# Patient Record
Sex: MALE | URBAN - METROPOLITAN AREA
[De-identification: names, ages, dates, MRNs, and addresses within clinical notes are randomized per-mention and may not be internally consistent; named-entity substitution may affect disease eponyms.]

---

## 2019-07-05 ENCOUNTER — TRANSFERRED RECORDS (OUTPATIENT)
Dept: HEALTH INFORMATION MANAGEMENT | Facility: CLINIC | Age: 59
End: 2019-07-05

## 2019-07-18 ENCOUNTER — TRANSFERRED RECORDS (OUTPATIENT)
Dept: HEALTH INFORMATION MANAGEMENT | Facility: CLINIC | Age: 59
End: 2019-07-18

## 2020-01-28 ENCOUNTER — TRANSFERRED RECORDS (OUTPATIENT)
Dept: HEALTH INFORMATION MANAGEMENT | Facility: CLINIC | Age: 60
End: 2020-01-28

## 2020-07-21 ENCOUNTER — TRANSFERRED RECORDS (OUTPATIENT)
Dept: HEALTH INFORMATION MANAGEMENT | Facility: CLINIC | Age: 60
End: 2020-07-21

## 2020-07-22 ENCOUNTER — TRANSCRIBE ORDERS (OUTPATIENT)
Dept: OTHER | Age: 60
End: 2020-07-22

## 2020-07-22 DIAGNOSIS — G25.9 MOVEMENT DISORDER: Primary | ICD-10-CM

## 2020-07-22 NOTE — TELEPHONE ENCOUNTER
"  RECORDS RECEIVED FROM: External   Date of Appt: 7/24/20   NOTES (FOR ALL VISITS) STATUS DETAILS   OFFICE NOTE from referring provider Received  Dr. Anjana Palma at [facility] (PCP):  7/21/20   OFFICE NOTE from other specialist Received Dr Yenifer Lu @ [facility] (PCP):  7/18/19 6/20/19   DISCHARGE SUMMARY from hospital N/A    DISCHARGE REPORT from the ER N/A    OPERATIVE REPORT N/A    MEDICATION LIST Received    IMAGING  (FOR ALL VISITS)     EMG N/A    EEG N/A    MRI (HEAD, NECK, SPINE) Received [facility]:  MRI Lumbar Spine 7/5/19   LUMBAR PUNCTURE N/A    DANIE Scan N/A    CT (HEAD, NECK, SPINE) N/A       Action 7/22/20 MV 2.41pm   Action Taken Records and imaging request faxed to [facility]    Records and imaging request faxed to [facility]     Action 7/23/20 6.49am   Action Taken Records received from [facility] via fax. Sent to urgent scanning     Phone Call:  7/23/20 MV 2.37pm   Contact Name [facility]   Outcome Called to check on status of request. Per HIM, the request was received and records will be sent over shortly.     Action 7/23/20 MV 2.56pm   Action Taken Records received from [facility] via fax. Sent to scanning     Phone Call:  7/24/20 MV 8.10am   Contact Name [facility]   Outcome Spoke with HIM, patient did not see neurology at [facility]. The only neurology-related note they have is a \"no show\" in 2019.                 "

## 2020-07-23 PROBLEM — M54.9 CHRONIC BACK PAIN: Status: ACTIVE | Noted: 2020-07-23

## 2020-07-23 PROBLEM — D69.6 THROMBOCYTOPENIA (H): Status: ACTIVE | Noted: 2020-07-23

## 2020-07-23 PROBLEM — G89.29 CHRONIC BACK PAIN: Status: ACTIVE | Noted: 2020-07-23

## 2020-07-23 PROBLEM — R25.9 ABNORMAL INVOLUNTARY MOVEMENT: Status: ACTIVE | Noted: 2020-07-23

## 2020-07-23 PROBLEM — F41.9 ANXIETY: Status: ACTIVE | Noted: 2020-07-23

## 2020-07-23 PROBLEM — I10 HTN (HYPERTENSION): Status: ACTIVE | Noted: 2020-07-23

## 2020-07-23 PROBLEM — S06.9XAA TBI (TRAUMATIC BRAIN INJURY) (H): Status: ACTIVE | Noted: 2020-07-23

## 2020-07-23 PROBLEM — E78.9 LIPID DISORDER: Status: ACTIVE | Noted: 2020-07-23

## 2020-07-23 PROBLEM — F32.A DEPRESSION: Status: ACTIVE | Noted: 2020-07-23

## 2020-07-23 PROBLEM — F89 DEVELOPMENTAL DISORDER: Status: ACTIVE | Noted: 2020-07-23

## 2020-07-23 PROBLEM — F17.200 TOBACCO DEPENDENCE: Status: ACTIVE | Noted: 2020-07-23

## 2020-07-23 RX ORDER — SERTRALINE HYDROCHLORIDE 100 MG/1
100 TABLET, FILM COATED ORAL DAILY
COMMUNITY
End: 2020-07-24

## 2020-07-23 RX ORDER — ATORVASTATIN CALCIUM 20 MG/1
20 TABLET, FILM COATED ORAL DAILY
COMMUNITY
End: 2020-07-24

## 2020-07-23 RX ORDER — CHLORTHALIDONE 25 MG/1
25 TABLET ORAL DAILY
COMMUNITY
End: 2020-07-24

## 2020-07-23 NOTE — PROGRESS NOTES
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 intermittent headache, dizziness, balance deficit, visual overstimulation, sleep impairment, fatigue, probable vision changes, diplopia, words jumping on the page, tinnitus, phonosensitivity, and mood changes.   2. Longstanding neurodevelopmental cognitive impairments, likely exacerbated by anxiety and depression per neuropsychological report.     PLAN:   1. Mild Traumatic brain injury. The pathophysiology and prognosis of the traumatic brain injury were explained to patient. The dangers of recurrent brain injury and alcohol use were also explained. Problems with mental and physical exertion were explained in great detail. Informational handouts provided. Had a rell conversation with the patient, and advised that given the period of time out from his injury date, it can be difficult to manage chronic and persisting symptoms. Discussed how compensatory strategies and energy management techniques may help to promote his safety, and improve quality of life measures.     2. Headache: Patient is encouraged to monitor frequency and intensity of headaches is mainly off her in follow-up about triggers. He prefers not to take medication for pain management.    3. Vision: Referral to developmental optometry.    4. Dizziness: Referral to audiology clinic for full vestibular evaluation.    5. Sleep/fatigue: Monitor, disputed this will improve as physical pain is reduced and energy returns to normal.    6. Cognitive symptoms: Referral to speech therapy for basic compensatory strategies and energy management techniques. Patient and his sister informed that this will only be a short course to provide him tools to improve his day-to-day functioning.    7. Housing/safety/any resources: Referral to TBI clinical .    8. Return to clinic follow-up in one to 2 months.    Sunni Mortensen PA-C, 6/28/2018 1:31 PM     Dictation Disclaimer: Notes are completed with voice-recognition dictation  software. Errors are generally corrected in real time. Please contact me via Epic staff message if you note any errors requiring clarification.       Electronically signed by Sunni Mortensen PA-C at 06/28/2018 2:20 PM CDT

## 2020-07-24 ENCOUNTER — PRE VISIT (OUTPATIENT)
Dept: NEUROLOGY | Facility: CLINIC | Age: 60
End: 2020-07-24

## 2020-07-24 ENCOUNTER — VIRTUAL VISIT (OUTPATIENT)
Dept: NEUROLOGY | Facility: CLINIC | Age: 60
End: 2020-07-24
Payer: COMMERCIAL

## 2020-07-24 DIAGNOSIS — F89 DEVELOPMENTAL DISORDER: ICD-10-CM

## 2020-07-24 DIAGNOSIS — F17.200 TOBACCO DEPENDENCE: ICD-10-CM

## 2020-07-24 DIAGNOSIS — F41.9 ANXIETY: ICD-10-CM

## 2020-07-24 DIAGNOSIS — E78.9 LIPID DISORDER: ICD-10-CM

## 2020-07-24 DIAGNOSIS — I35.9 AORTIC VALVE CALCIFICATION: ICD-10-CM

## 2020-07-24 DIAGNOSIS — H54.7 VISION PROBLEMS: Primary | ICD-10-CM

## 2020-07-24 DIAGNOSIS — D69.6 THROMBOCYTOPENIA (H): ICD-10-CM

## 2020-07-24 DIAGNOSIS — R25.9 ABNORMAL INVOLUNTARY MOVEMENT: ICD-10-CM

## 2020-07-24 PROBLEM — F32.1 CURRENT MODERATE EPISODE OF MAJOR DEPRESSIVE DISORDER WITHOUT PRIOR EPISODE (H): Status: ACTIVE | Noted: 2020-07-21

## 2020-07-24 PROBLEM — J44.9 COPD (CHRONIC OBSTRUCTIVE PULMONARY DISEASE) (H): Status: ACTIVE | Noted: 2020-07-24

## 2020-07-24 PROBLEM — F02.80 MAJOR NEUROCOGNITIVE DISORDER DUE TO TRAUMATIC BRAIN INJURY (H): Status: ACTIVE | Noted: 2019-08-13

## 2020-07-24 PROBLEM — G25.9 MOVEMENT DISORDER: Status: ACTIVE | Noted: 2020-07-21

## 2020-07-24 PROBLEM — Z87.828 HISTORY OF TRAUMATIC HEAD INJURY: Status: ACTIVE | Noted: 2020-07-21

## 2020-07-24 PROBLEM — E78.00 PURE HYPERCHOLESTEROLEMIA: Status: ACTIVE | Noted: 2017-09-09

## 2020-07-24 PROBLEM — Z12.11 SCREENING FOR COLON CANCER: Status: ACTIVE | Noted: 2017-09-07

## 2020-07-24 PROBLEM — S06.9XAS MAJOR NEUROCOGNITIVE DISORDER DUE TO TRAUMATIC BRAIN INJURY (H): Status: ACTIVE | Noted: 2019-08-13

## 2020-07-24 PROBLEM — F81.0 SPECIFIC LEARNING DISORDER, WITH IMPAIRMENT IN READING, MODERATE: Status: ACTIVE | Noted: 2019-08-13

## 2020-07-24 RX ORDER — CHLORTHALIDONE 50 MG/1
TABLET ORAL
COMMUNITY
Start: 2020-02-13 | End: 2020-07-24

## 2020-07-24 RX ORDER — SERTRALINE HYDROCHLORIDE 100 MG/1
TABLET, FILM COATED ORAL
COMMUNITY
Start: 2020-07-24 | End: 2020-08-03

## 2020-07-24 RX ORDER — ATORVASTATIN CALCIUM 40 MG/1
TABLET, FILM COATED ORAL
COMMUNITY
Start: 2020-07-24

## 2020-07-24 RX ORDER — PROPRANOLOL HCL 60 MG
1 CAPSULE, EXTENDED RELEASE 24HR ORAL
COMMUNITY
Start: 2020-04-29 | End: 2020-07-24

## 2020-07-24 RX ORDER — AMLODIPINE BESYLATE 5 MG/1
5 TABLET ORAL DAILY
COMMUNITY
Start: 2020-07-24

## 2020-07-24 RX ORDER — CHLORTHALIDONE 25 MG/1
TABLET ORAL
COMMUNITY
Start: 2020-07-24 | End: 2020-07-24

## 2020-07-24 RX ORDER — CITALOPRAM HYDROBROMIDE 10 MG/1
10 TABLET ORAL DAILY
COMMUNITY
Start: 2020-07-24

## 2020-07-24 RX ORDER — PROPRANOLOL HCL 60 MG
CAPSULE, EXTENDED RELEASE 24HR ORAL
COMMUNITY
Start: 2020-07-24

## 2020-07-24 RX ORDER — SERTRALINE HYDROCHLORIDE 25 MG/1
25 TABLET, FILM COATED ORAL
COMMUNITY
Start: 2020-07-21 | End: 2020-07-24

## 2020-07-24 RX ORDER — CITALOPRAM HYDROBROMIDE 10 MG/1
10 TABLET ORAL
COMMUNITY
Start: 2020-08-11 | End: 2020-07-24

## 2020-07-24 RX ORDER — AMLODIPINE BESYLATE 5 MG/1
TABLET ORAL
COMMUNITY
Start: 2020-07-24 | End: 2020-07-24

## 2020-07-24 RX ORDER — ATORVASTATIN CALCIUM 40 MG/1
0.5 TABLET, FILM COATED ORAL
COMMUNITY
Start: 2020-02-13 | End: 2020-07-24

## 2020-07-24 RX ORDER — CHLORTHALIDONE 50 MG/1
TABLET ORAL
COMMUNITY
Start: 2020-07-24 | End: 2020-07-24

## 2020-07-24 RX ORDER — AMLODIPINE BESYLATE 5 MG/1
5 TABLET ORAL
COMMUNITY
Start: 2020-07-21 | End: 2020-07-24

## 2020-07-24 RX ORDER — SERTRALINE HYDROCHLORIDE 25 MG/1
TABLET, FILM COATED ORAL
COMMUNITY
Start: 2020-07-24 | End: 2020-08-03

## 2020-07-24 RX ORDER — SERTRALINE HYDROCHLORIDE 100 MG/1
1 TABLET, FILM COATED ORAL
COMMUNITY
Start: 2019-06-20 | End: 2020-07-24

## 2020-07-24 NOTE — PATIENT INSTRUCTIONS
Assessment:  Developmental disorder - finished 9th grade and had problems with school   Hypertension 140/85 yesterday - pulse was 66 on one antihypertensive medication and second is  Being added.   TBI - had brain surgery  Chronic back pain  Mood disorder - ongoing problems - suppose to transition from sertraline to citalopram  Smoker/severe copd  Visual problems  Has some hearing problems.   Constipation but is not taking anything for this.   Nocturia with normal PSA  Mild anemia and thrombocytopenia    Consented to stephanie access to [facility]    He is left handed man with history of involuntary movement    He has had involuntary tongue protrusion for 4 years. There is no clear aggravating or ameliorative factors. He has involuntary movements of his arms and legs. These appear to be choreiform in nature.      Cognitive decline    Medications            Amlodipine norvasc 5mg Has not yet started       Atorvastatin lipitor 40mg  1/2       Chlorthalidone hygroton 25mg  Not taking       Citalopram celexa 10mg Has not  Yet started       Cyanocobalamin Vitamin B12 1000mcg 1       Propranolol ER Inderal LA 60mg 24 hr capsule 1       Sertraline zoloft 25mg Using for wean       Sertraline zoloft 100mg  Will wean off       Tiotropium spiriva respimat 2.5 mcg/act inhaler 2 puffs once or ?twice daily                                                                                                                                                                 Plan:    Needs an eye appointment - optometry - p [facility] in the [facility] area    Needs followup Chest CT scan to compare with  His 3/2019 study as a screen for lung cancer    Consider cardiac echocardiogram to look at his aortic valves    Genetics visit with Conor Gaspar to discuss [facility] disease testing    Will need a face to face visit with me or/and a fellow to review his examination to help sort out the nature of his movements (functional,  neurological, etc. ) he denies a history of neuroleptic drug exposure.     Head CT scan without contrast to make sure that the subdural has resolved and no other lesions noted. He has movements so will start with the CT scan as it is faster than a brain MRI.     Pharmacy visit    He is being transitioned in his antidepressant medications but has not picked up the 25mg of sertraline and the 10mg of citalopram    He also has not picked up the second blood pressure medication (amlodipine) to take with his beta blocker propranolol     He is continuing on his cholesterol medication atorvastatin/lipitor 1/2 of 40mg    He is not interested in quitting smoking and has had severe emphysema based on his ct scan and uses the inhaler twice a daily - ?should it be once daily    He is continuing on his vitamin B12.

## 2020-07-24 NOTE — LETTER
"[facility]  [facility]  [address] [address] [phone]            2020          Dear [facility] Proxy Patient,    We received a request to activate you as a proxy for another patient of [facility] or Jasbir. In order to do so, we need to activate your [employer] account as well.    Your access code is: [unfilled]       Please access the [employer] website:  -  [place]: [email or web address]  -  [email or web address].    Below the ID and password fields, select the \"Sign Up Now\" as New User.  You will be prompted to enter the access code listed above as well as additional personal information.  Please follow the directions carefully when creating your username and password.    Once your account is activated, you can access the proxy accounts under \"Shared Medical Records\".    If you allow your access code to , or if you have any questions please call [employer] support at [phone] during normal clinic hours.     Sincerely,        [employer] Customer Service    "
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
## 2020-07-28 ENCOUNTER — TELEPHONE (OUTPATIENT)
Dept: NEUROLOGY | Facility: CLINIC | Age: 60
End: 2020-07-28

## 2020-07-28 NOTE — TELEPHONE ENCOUNTER
Left message for patient to schedule genetic consult. Offered appointment times for 7/31    ----- Message from Ck Reddy MD sent at 7/24/2020  3:33 PM CDT -----  Regarding: HD movement consultation  Will need a visit with Conor Gaspar for HD testing and a visit with me or a fellow to do a face to face visit as well as a HEAD CT SCAN.

## 2020-07-31 ENCOUNTER — TELEPHONE (OUTPATIENT)
Dept: NEUROLOGY | Facility: CLINIC | Age: 60
End: 2020-07-31

## 2020-07-31 NOTE — TELEPHONE ENCOUNTER
Health Call Center    Phone Message    May a detailed message be left on voicemail: yes     Reason for Call: Other: Shraddha calling to request office notes from 07/24. Please fax notes to [facility] [phone].    Please call Shraddha back with any questions.       Action Taken: Message routed to:  [facility]  neuro     Travel Screening: Not Applicable

## 2020-08-03 ENCOUNTER — VIRTUAL VISIT (OUTPATIENT)
Dept: PHARMACY | Facility: CLINIC | Age: 60
End: 2020-08-03
Payer: COMMERCIAL

## 2020-08-03 DIAGNOSIS — E78.00 PURE HYPERCHOLESTEROLEMIA: ICD-10-CM

## 2020-08-03 DIAGNOSIS — E53.8 VITAMIN B12 DEFICIENCY (NON ANEMIC): ICD-10-CM

## 2020-08-03 DIAGNOSIS — I10 ESSENTIAL HYPERTENSION: ICD-10-CM

## 2020-08-03 DIAGNOSIS — F33.1 MAJOR DEPRESSIVE DISORDER, RECURRENT EPISODE, MODERATE (H): Primary | ICD-10-CM

## 2020-08-03 PROCEDURE — 99607 MTMS BY PHARM ADDL 15 MIN: CPT | Mod: TEL | Performed by: PHARMACIST

## 2020-08-03 PROCEDURE — 99605 MTMS BY PHARM NP 15 MIN: CPT | Mod: TEL | Performed by: PHARMACIST

## 2020-08-03 NOTE — PROGRESS NOTES
"MTM ENCOUNTER  SUBJECTIVE/OBJECTIVE:                           Cristian Rene is a 59 year old male called for an initial visit. He was referred to me from Dr. Reddy. Patient was accompanied by Nani (sister).     Patient consented to a telehealth visit: yes  Telemedicine Visit Details  Type of service:  Telephone visit  Start Time: 11:18 AM  End Time: 11:28 AM  Originating Location (pt. Location): Home  Distant Location (provider location):  [facility]UROLOGY [facility]IN[facility] MTM  Mode of Communication:  Telephone    Chief Complaint: initial medication review    Allergies/ADRs: Reviewed in EHR  Tobacco:  reports that he has been smoking. He has never used smokeless tobacco.  Alcohol: not currently using  Caffeine: multiple cups/day of coffee and [employer]eps[employer] - unable to quantify but states \"drinking caffeine all day\"  Activity: not discussed  PMH: Reviewed in EHR    Medication Adherence/Access: no issues reported    Depression:  Current medications include: sertraline 25 mg daily and citalopram 10 mg daily. Patient is currently tapering off sertraline and onto citalopram. Last PHQ-9 was 12 on 7/21/20. Plan to change antidepressants due to concern that they are contributing to the patient's choreic movements.     Hypertension: Current medications include amlodipine 5 mg daily and propranolol ER 60 mg daily.  Patient does not self-monitor BP.  Patient reports no current medication side effects. Denies any improvement in tremor since starting propranolol. Last clinic BP was 140/85 on 7/21/20 at primary care.    Hyperlipidemia: Current therapy includes atorvastatin 20 mg once daily.  Pt reports no significant myalgias or other side effects.    Last lipid panel from 7/18/19:      Vitamin B12 deficiency: Taking vitamin B12 1000 mcg daily. Last serum level was 678 from 7/18/19.     Today's Vitals: There were no vitals taken for this visit.  (telemed visit)    ASSESSMENT:                            Medication Adherence: excellent, no " issues identified    Depression:  Needs improvement. Now that patient is on citalopram he should stop taking sertraline to prevent risk of serotonin syndrome.     Hypertension: stable.    Hyperlipidemia: stable.    Vitamin B12 deficiency: stable.    PLAN:                            1. Patient to stop sertraline and continue on citalopram 10 mg daily and follow up with PCP.  2. No other medication changes recommended at this time.     I spent 10 minutes with this patient today. I offer these suggestions for consideration by Dr. Reddy. A copy of the visit note was provided to the patient's referring provider.    Will follow up in 1 year or sooner if needed    The patient declined a summary of these recommendations.     Yris Grady, Pharm.D.  Medication Therapy Management Pharmacist  Phone: [phone]

## 2020-08-04 ENCOUNTER — TELEPHONE (OUTPATIENT)
Dept: OPHTHALMOLOGY | Facility: CLINIC | Age: 60
End: 2020-08-04

## 2020-08-04 NOTE — TELEPHONE ENCOUNTER
[facility] Call Center    Phone Message    May a detailed message be left on voicemail: yes     Reason for Call: Appointment Intake    Referring Provider Name: Ck Reddy  Diagnosis and/or Symptoms: Vision problems    Pts sister called to set up Appt for Pt, but sister was unable to identify what type of vision issues the Pt is having. I tried calling the back line. Pt would like to be seen ASAP. Please advise. Thank you    Action Taken: Message routed to:  [facility] EYE    Travel Screening: Not Applicable

## 2020-08-04 NOTE — TELEPHONE ENCOUNTER
TRIAGE TELEPHONE ENCOUNTER  Date contacted: August 4, 2020 4:03 PM    Type of contact: LVM  Attending provider(s) or other suggested: [place] versus other optometrist willing to see new patient with ongoing tx w/ neurology.     Current eye symptoms/concerns: increased vision blurriness    Suggested treatment plan: optometry eval (as indicated by referal note from Nuerology MD Reddy)     Refills needed: n/a    Next/current appointment date: tbd  Best date/time for rescheduling or with another provider or clinic: tbd    Informed about the eye clinic COVID-19 mask and visitor policy: DALE Lugo COT 4:03 PM August 4, 2020

## 2020-08-05 ENCOUNTER — ANCILLARY PROCEDURE (OUTPATIENT)
Dept: CT IMAGING | Facility: CLINIC | Age: 60
End: 2020-08-05
Attending: PSYCHIATRY & NEUROLOGY
Payer: COMMERCIAL

## 2020-08-05 DIAGNOSIS — R25.9 ABNORMAL INVOLUNTARY MOVEMENT: ICD-10-CM

## 2020-08-07 NOTE — PROGRESS NOTES
GENETIC COUNSELING-Neurology  I attempted to start a telephone visit and the family told me that they need to reschedule until 8/28/2020 at 11:30 AM.    Conor Gaspar MS, [facility] [place]  Certified Genetic Counselor

## 2020-08-28 ENCOUNTER — VIRTUAL VISIT (OUTPATIENT)
Dept: NEUROLOGY | Facility: CLINIC | Age: 60
End: 2020-08-28
Attending: PSYCHIATRY & NEUROLOGY
Payer: COMMERCIAL

## 2020-08-28 DIAGNOSIS — R25.9 ABNORMAL INVOLUNTARY MOVEMENT: Primary | ICD-10-CM

## 2020-08-28 NOTE — PROGRESS NOTES
GENETIC COUNSELING-Neurology  I attempted to reach Cristian twice today for his telephone visit and had no answer. I left a voicemail asking them to call me back.    Conor Gaspar MS, [facility] [place]  Certified Genetic Counselor

## 2020-08-28 NOTE — LETTER
8/7/2020     RE: Cristian Rene  [address]  [place] [address]     Dear Colleague,    Thank you for referring your patient, Cristian Rene, to the [facility] NEUROLOGY at [facility]. Please see a copy of my visit note below.    GENETIC COUNSELING-Neurology  I attempted to start a telephone visit and the family told me that they need to reschedule until 8/28/2020 at 11:30 AM.    Conor Gaspar MS, [facility] [place] [facility]  Certified Genetic Counselor    Again, thank you for allowing me to participate in the care of your patient.      Sincerely,    Alfredo Gaspar GC

## 2020-09-04 ENCOUNTER — VIRTUAL VISIT (OUTPATIENT)
Dept: NEUROLOGY | Facility: CLINIC | Age: 60
End: 2020-09-04
Payer: COMMERCIAL

## 2020-09-04 ENCOUNTER — TELEPHONE (OUTPATIENT)
Dept: NEUROLOGY | Facility: CLINIC | Age: 60
End: 2020-09-04

## 2020-09-04 DIAGNOSIS — R25.9 ABNORMAL INVOLUNTARY MOVEMENT: Primary | ICD-10-CM

## 2020-09-04 DIAGNOSIS — G25.5 CHOREA: Primary | ICD-10-CM
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
## 2020-09-04 NOTE — TELEPHONE ENCOUNTER
GENETIC COUNSELING-NEurology  I connected with Cristian and his sister today and I am waiting for the formal appintment to be created to document my note. I am using this telephone encounter to place the order for HD genetic testing    Conor Gaspar MS, [facility]  C
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
## 2020-10-27 ENCOUNTER — TELEPHONE (OUTPATIENT)
Dept: NEUROLOGY | Facility: CLINIC | Age: 60
End: 2020-10-27

## 2020-10-27 NOTE — TELEPHONE ENCOUNTER
GENETIC COUNSELING-Neurlogy  Cristian's sister, Jean Marie, had called me and no longer had the number to schedule for Cristian's blood draw. I tried to call her back once, but she could not speak with me at that time. I called today and talked to Cristian and gave him the number for the [facility] [phone] and told him that they could call that number to schedule a blood draw.    Conor Gaspar MS, [facility]  Certified Genetic Counselor

## 2021-01-14 ENCOUNTER — TELEPHONE (OUTPATIENT)
Dept: NEUROLOGY | Facility: CLINIC | Age: 61
End: 2021-01-14

## 2021-01-14 NOTE — TELEPHONE ENCOUNTER
There is no consent to communicate with sister on file. She got Cristian to give verbal consent.    Patient spoke with Conor Gaspar on 9/4/20, Jean Marie was on the call with them.    Patient was to obtain a blood draw at the [facility] [phone] to schedule (10/27/20).    I told Jean Marie to call the [facility] and schedule a blood draw for Cristian. Once the results come back, Cristian will need to be seen in clinic with Dr. Reddy and Conor Gaspar.

## 2021-01-14 NOTE — TELEPHONE ENCOUNTER
[facility] Call Center    Phone Message    May a detailed message be left on voicemail: yes     Reason for Call: Other: Patients sister Jean Marie calling in regards to patient being seen in Neurology clinic - states that after patient was seen they discussed sending patient somewhere else to be seen - states that she is not sure the name of the provider that patient saw but it looks like dakota saw Dr. Rdedy and Conor Gaspar - states that she is wondering what the next steps of patients care is or where patient was being referred to.     Please advise and call Jean Marie back at your earliest convenience to discuss further     Action Taken: Other: [facility] [place]    Travel Screening: Not Applicable

## 2021-02-09 ENCOUNTER — TRANSFERRED RECORDS (OUTPATIENT)
Dept: HEALTH INFORMATION MANAGEMENT | Facility: CLINIC | Age: 61
End: 2021-02-09

## 2021-02-09 DIAGNOSIS — G25.5 CHOREA: ICD-10-CM

## 2021-02-09 PROCEDURE — 81271 HTT GENE DETC ABNOR ALLELES: CPT | Performed by: GENETIC COUNSELOR, MS

## 2021-02-09 PROCEDURE — 36415 COLL VENOUS BLD VENIPUNCTURE: CPT | Performed by: GENETIC COUNSELOR, MS

## 2021-02-09 PROCEDURE — G0452 MOLECULAR PATHOLOGY INTERPR: HCPCS | Performed by: PATHOLOGY

## 2021-02-10 LAB — HUNTINGTON DISEASE MOLECULAR ANALYSIS: NORMAL

## 2021-02-25 ENCOUNTER — TELEPHONE (OUTPATIENT)
Dept: NEUROLOGY | Facility: CLINIC | Age: 61
End: 2021-02-25

## 2021-02-25 NOTE — TELEPHONE ENCOUNTER
Called Cristian and spoke with his sister Jean Marie. I informed Jean Marie of what is left for Madhu plan of care:    Whats needed:  -See Optometry  -Face to face visit with Dr. Reddy or fellow  -Chest CT  -Echocardiogram to look at valves    Plan/recommendation:  1. Jean Marie will get him schedule to see an optometrist. He will see Dr. Reddy face to face on Friday June 18th at 8:30 AM and be put on the wait list if something sooner opens up. Message sent to the clinic coordinators to get him scheduled.    2. Cristian does not have a cardiologist or pulmonologist he see's. Jean Marie will help him follow-up with his PCP for the repeat chest CT and echocardiogram.

## 2021-02-25 NOTE — TELEPHONE ENCOUNTER
GENETIC COUNSELING-Neurology  I spoke with Jean Marie Styles (Cristian's sister) about normal genetic testing for Weakley disease. Cristian appears to have 19 repeats on both of his copies of the HTT gene, which is a normal result. The testing cannot exclude the small possibility of a very large repeat in the Weakley disease gene- but that would not be an expected finding in someone who is 60 years old.  I indicated that this testing makes the diagnosis of Weakley disease extremely unlikely.    His sister asked what the next steps would be for Cristian's evaluation. I indicated that I would share this result with Dr. Reddy.    Conor Gaspar MS, [facility]  Certified Genetic Counselor

## 2021-02-25 NOTE — TELEPHONE ENCOUNTER
Per message from Dr. Reddy:  Would recommend a face to face visit.   There were an assortment of issues that we brought up in July - wondering if one of the movement  disorders nurses could call them and review what has been done and what they would like to do. Face to face at  or [place] with me or if a fellow has openings       He did the head ct and visited with Yris         Needs an eye appointment - optometry - p ossibly in the [facility] area       Needs followup Chest CT scan to compare with  His 3/2019 study as a screen for lung cancer       Consider cardiac echocardiogram to look at his aortic valves       Genetics visit with Conor Gaspar to discuss [place] disease testing       Will need a face to face visit with me or/and a fellow to review his examination to help sort out the nature of his movements (functional, neurological, etc. ) he denies a history of neuroleptic drug exposure.       Head CT scan without contrast to make sure that the subdural has resolved and no other lesions noted. He has movements so will start with the CT scan as it is faster than a brain MRI.       Pharmacy visit       He is being transitioned in his antidepressant medications but has not picked up the 25mg of sertraline and the 10mg of citalopram       He also has not picked up the second blood pressure medication (amlodipine) to take with his beta blocker propranolol       He is continuing on his cholesterol medication atorvastatin/lipitor 1/2 of 40mg       He is not interested in quitting smoking and has had severe emphysema based on his ct scan and uses the inhaler twice a daily - ?should it be once daily       He is continuing on his vitamin B12

## 2021-03-03 DIAGNOSIS — S06.5XAA SUBDURAL HEMATOMA (H): ICD-10-CM

## 2021-03-03 DIAGNOSIS — G25.5 CHOREA: Primary | ICD-10-CM

## 2021-03-03 RX ORDER — SERTRALINE HYDROCHLORIDE 25 MG/1
TABLET, FILM COATED ORAL
COMMUNITY
Start: 2020-07-21 | End: 2021-06-05

## 2021-03-03 RX ORDER — CHLORTHALIDONE 50 MG/1
TABLET ORAL
COMMUNITY
Start: 2020-03-18 | End: 2021-06-05

## 2021-03-03 RX ORDER — LANOLIN ALCOHOL/MO/W.PET/CERES
CREAM (GRAM) TOPICAL
COMMUNITY
Start: 2020-05-16 | End: 2021-06-05

## 2021-03-03 RX ORDER — SERTRALINE HYDROCHLORIDE 100 MG/1
TABLET, FILM COATED ORAL
COMMUNITY
Start: 2020-05-16 | End: 2021-06-05

## 2021-05-31 PROBLEM — F89 NEURODEVELOPMENTAL DISORDER: Status: ACTIVE | Noted: 2017-05-02

## 2021-05-31 PROBLEM — F33.1 MAJOR DEPRESSIVE DISORDER, RECURRENT EPISODE, MODERATE (H): Status: ACTIVE | Noted: 2017-05-02

## 2021-06-20 ENCOUNTER — HEALTH MAINTENANCE LETTER (OUTPATIENT)
Age: 61
End: 2021-06-20

## 2021-08-31 ENCOUNTER — TELEPHONE (OUTPATIENT)
Dept: NEUROLOGY | Facility: CLINIC | Age: 61
End: 2021-08-31

## 2021-08-31 NOTE — CONFIDENTIAL NOTE
I received a page from hospitalist Dr. Jo at [facility] (cell: [phone]). He reports that this patient presented with dyspnea and abnormal movements. He saw Dr. Reddy in June 2021 and has a nonspecific choreiform disorder. No obvious infection, pulmonary or otherwise during this admission. They have been giving him Zyprexa and Ativan for abnormal movements and now he is quite sedated. Question is whether or not he would benefit from transfer. Apparently the neurologists there do not feel they have much to add. I do not think a transfer is necessary at this time. I recommended that they hold the sedating agents and get a history in the morning from the patient, specifically about the nature of the movements and whether or not they have worsened or generally are at baseline. I will also reach out to Dr. Reddy tomorrow on Dr. Jo's behalf.    Lise Zarate DO

## 2021-09-07 ENCOUNTER — TELEPHONE (OUTPATIENT)
Dept: NEUROLOGY | Facility: CLINIC | Age: 61
End: 2021-09-07

## 2021-09-07 NOTE — TELEPHONE ENCOUNTER
[facility] Call Center    Phone Message    May a detailed message be left on voicemail: yes     Reason for Call: Symptoms or Concerns     If patient has red-flag symptoms, warm transfer to triage line    Current symptom or concern: Nursing home reports that patient has been flopping around on the floor and occasionally hits head on walls, objects, etc. Caller reports their is an onsite provider that can assist with his immediate medical needs, but they want to check if there's anything that can be prescribed to assist with these movements. Caller reports their records from patient's recent ER visit states some of patient's meds were discontinued. Their facility is wondering if patient needs them again or something else to help with this movements.     Symptoms have been present for:  1 day(s)    Has patient previously been seen for this? Yes    By : Dr. Reddy and Dr. Cutler    Are there any new or worsening symptoms? Yes: Symptoms are new, but nursing home also stated patient is a recent arrival to their facility, so they aren't sure how common the symptoms are for the patient. Caller declined to check in with general triage nurse line. Caller reports presence of onsite provider at their facility, so they want to check in with patient's neurology care team about possible medications.       Action Taken: Message routed to:  [facility] Neurology    Travel Screening: Not Applicable

## 2021-09-08 NOTE — TELEPHONE ENCOUNTER
"Situation:  Cristian Rene is a 60 year old male who receives support for abnormal involuntary movement/choriform movements. Cristian's nursing home calls today with concerns for symptoms such as \"flopping on the floor\" and hitting his head.    Background:  PMHX: TBI, movement disorder, anxiety/depression, thrombocytopenia    Addendum: Cristian was a no show for his visit on 6/18/2021 and was not seen. He was last seen on 7/24/2020.    7/24/2020 visit with Dr. Reddy:  He has had involuntary tongue protrusion for 4 years. There is no clear aggravating or ameliorative factors. He has involuntary movements of his arms and legs. These appear to be choreiform in nature.    Medications (verified with ERICK Natarajan):  Amlodipine 5 mg daily  Lipitor 20 mg daily  Celexa 10 mg daily  Zyprexa 2.5 mg at bedtime (started yesterday)  He is no longer taking propranolol ER 60 mg, Tiotropium, vitamin B12 tablets or chlorthalidone.    Assessment:  Since arriving at [facility] movements have progressively been getting worse. His back arches, his shoulders wiggle/roll and then his hips will start wiggling/rolling. This has caused him to slide out of bed/chairs. When on the floor the movements are so strong that it causes him to flip back and forth from his stomach to his back. The movements are fast and real describes them as constant and rhythmic. He has not lost consciousness, or has seemed to shake. They have not been able to reach his sister regarding his care. He is alert, unable to speak or keep attention on anything. They are worried that he will not be able to eat safely due to this. Real asks if he normally was not able to talk previously. Currently they are using mats next to his bed to prevent injury.They would not be able to get him here for a visit due to his moevements    Denies bruising, bleeding, or changes in consciousness since hitting his head.    Plan/recommendation:  1. I advised Real Foster needs " to be seen, his movements are very different than what Dr. Reddy described in his note in June. She will be available for a video visit on Tuesday September 14th at 2 PM.   2. If Cristian continues to worsen and/or is unable to take anything by mouth safely he should be admitted at the emergency room for immediate assistance.    Addendum:  Left a message for Rosa Isela asking for a good e-mail or number for Dr. Reddy to send a link to at the time of the visit.    14 minute phone call

## 2021-09-09 PROBLEM — E44.0 MODERATE PROTEIN-CALORIE MALNUTRITION (H): Status: ACTIVE | Noted: 2021-08-31

## 2021-09-09 NOTE — TELEPHONE ENCOUNTER
Left a message for Rosa Isela informing her that Cristian would need to be seen in person given his new symptoms and he has not been seen in over a year. I asked that she call me back to determine when he can see either Dr. Reddy of the movement fellow in person.

## 2021-09-09 NOTE — TELEPHONE ENCOUNTER
[facility] Call Center    Phone Message    May a detailed message be left on voicemail: yes     Reason for Call: Mai is calling to schedule an appointment for 9/14/21 at 7:15 a.m.  Patient will be sent to our clinic on a stretcher and is wondering how clinic will handle this.  Please reach out to Mai.    Action Taken: Message routed to:  [facility] Neurology    Travel Screening: Not Applicable

## 2021-09-09 NOTE — TELEPHONE ENCOUNTER
Rosa Isela will call me back if an appointment at 7:15 AM with Dr. Sandoval works for them to get Cristian here for an in person visit with Dr. Sandoval. She is not sure if they will be able to have someone assist him from transport to the clinic, she will look into this. [place] have a [place] . Rosa Isela is not sure if this is something their  can help with. There  Melody is working with him Phone: [phone].

## 2021-09-10 NOTE — TELEPHONE ENCOUNTER
"Mai, Health Unit Coordinator reported she can set up transportation for him to get here on Wednesday at 1 PM to see Dr. Sandoval and Dr. De Leon by stretcher. She will set this up on Monday. Dr. Sandoval can call her around the start time of the visit at [phone] (direct line) and she will transfer her the nurse or nurse manager. They are trying to get a staff member to go with him if possible. We will have our RRT team meet them downstairs to transfer him to our stretcher and get him into the clinic for the appointment. He will be sent with an envelope with his face sheet, transportation information and referral form to fill out if any orders are written. She ask that we fax his AVS to Fax: [phone].    We would use the phone number on the outside of the envelope to call for return transportation. When we call we should say he needs a \"return ride.\"    I let mai know we are looking into seeing if we can do a home visit but so far it is looking like this will not be the case.        "

## 2021-09-14 NOTE — TELEPHONE ENCOUNTER
Mai called and informed me that Cristian was admitted to the hospital yesterday for breathing issues. They attempted to toilet him with a 2 person assist and left him in the bathroom. They heard a crash while waiting for him and found him lying on the side of the toilet with worse movements than prior to using the bathroom. He started had breathing problems after this. Mai is not sure what hospital he is in.

## 2021-10-11 ENCOUNTER — HEALTH MAINTENANCE LETTER (OUTPATIENT)
Age: 61
End: 2021-10-11

## 2022-01-30 ENCOUNTER — HEALTH MAINTENANCE LETTER (OUTPATIENT)
Age: 62
End: 2022-01-30

## 2022-02-06 NOTE — PATIENT INSTRUCTIONS
Patient Education        Well Visit, Men 48 to 72: Care Instructions  Overview     Well visits can help you stay healthy. Your doctor has checked your overall health and may have suggested ways to take good care of yourself. Your doctor also may have recommended tests. At home, you can help prevent illness with healthy eating, regular exercise, and other steps. Follow-up care is a key part of your treatment and safety. Be sure to make and go to all appointments, and call your doctor if you are having problems. It's also a good idea to know your test results and keep a list of the medicines you take. How can you care for yourself at home? · Get screening tests that you and your doctor decide on. Screening helps find diseases before any symptoms appear. · Eat healthy foods. Choose fruits, vegetables, whole grains, protein, and low-fat dairy foods. Limit fat, especially saturated fat. Reduce salt in your diet. · Limit alcohol. Have no more than 2 drinks a day or 14 drinks a week. · Get at least 30 minutes of exercise on most days of the week. Walking is a good choice. You also may want to do other activities, such as running, swimming, cycling, or playing tennis or team sports. · Reach and stay at a healthy weight. This will lower your risk for many problems, such as obesity, diabetes, heart disease, and high blood pressure. · Do not smoke. Smoking can make health problems worse. If you need help quitting, talk to your doctor about stop-smoking programs and medicines. These can increase your chances of quitting for good. · Care for your mental health. It is easy to get weighed down by worry and stress. Learn strategies to manage stress, like deep breathing and mindfulness, and stay connected with your family and community. If you find you often feel sad or hopeless, talk with your doctor. Treatment can help.   · Talk to your doctor about whether you have any risk factors for sexually transmitted infections (STIs). You can help prevent STIs if you wait to have sex with a new partner (or partners) until you've each been tested for STIs. It also helps if you use condoms (male or female condoms) and if you limit your sex partners to one person who only has sex with you. Vaccines are available for some STIs. · If it's important to you to prevent pregnancy with your partner, talk with your doctor about birth control options that might be best for you. · If you think you may have a problem with alcohol or drug use, talk to your doctor. This includes prescription medicines (such as amphetamines and opioids) and illegal drugs (such as cocaine and methamphetamine). Your doctor can help you figure out what type of treatment is best for you. · Protect your skin from too much sun. When you're outdoors from 10 a.m. to 4 p.m., stay in the shade or cover up with clothing and a hat with a wide brim. Wear sunglasses that block UV rays. Even when it's cloudy, put broad-spectrum sunscreen (SPF 30 or higher) on any exposed skin. · See a dentist one or two times a year for checkups and to have your teeth cleaned. · Wear a seat belt in the car. When should you call for help? Watch closely for changes in your health, and be sure to contact your doctor if you have any problems or symptoms that concern you. Where can you learn more? Go to [email or web address] org and sign in to your [employer] account. Enter P904 in the [facility] box to learn more about \"Well Visit, Men 48 to 72: Care Instructions. \"     If you do not have an account, please click on the \"Sign Up Now\" link. Current as of: October 6, 2021               Content Version: 13.1  © 2006-2021 Healthwise, Incorporated. Care instructions adapted under license by [facility] ([facility]).  If you have questions about a medical condition or this instruction, always ask your healthcare professional. [address]  any warranty or liability for your use of this information.
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
## 2022-02-07 ENCOUNTER — OFFICE VISIT (OUTPATIENT)
Dept: PRIMARY CARE CLINIC | Age: 62
End: 2022-02-07
Payer: COMMERCIAL

## 2022-02-07 VITALS
HEART RATE: 94 BPM | SYSTOLIC BLOOD PRESSURE: 162 MMHG | TEMPERATURE: 97.7 F | WEIGHT: 140 LBS | BODY MASS INDEX: 21.97 KG/M2 | DIASTOLIC BLOOD PRESSURE: 94 MMHG | OXYGEN SATURATION: 98 % | HEIGHT: 67 IN

## 2022-02-07 DIAGNOSIS — Z13.1 SCREENING FOR DIABETES MELLITUS: ICD-10-CM

## 2022-02-07 DIAGNOSIS — Z12.11 SCREEN FOR COLON CANCER: ICD-10-CM

## 2022-02-07 DIAGNOSIS — I10 ESSENTIAL HYPERTENSION: ICD-10-CM

## 2022-02-07 DIAGNOSIS — Z12.2 SCREENING FOR LUNG CANCER: ICD-10-CM

## 2022-02-07 DIAGNOSIS — G25.9 MOVEMENT DISORDER: ICD-10-CM

## 2022-02-07 DIAGNOSIS — F17.210 CIGARETTE NICOTINE DEPENDENCE WITHOUT COMPLICATION: ICD-10-CM

## 2022-02-07 DIAGNOSIS — Z13.220 SCREENING FOR HYPERLIPIDEMIA: ICD-10-CM

## 2022-02-07 DIAGNOSIS — Z76.89 ENCOUNTER TO ESTABLISH CARE WITH NEW DOCTOR: Primary | ICD-10-CM

## 2022-02-07 DIAGNOSIS — Z11.4 SCREENING FOR HIV (HUMAN IMMUNODEFICIENCY VIRUS): ICD-10-CM

## 2022-02-07 DIAGNOSIS — Z11.59 ENCOUNTER FOR HEPATITIS C SCREENING TEST FOR LOW RISK PATIENT: ICD-10-CM

## 2022-02-07 PROCEDURE — 99386 PREV VISIT NEW AGE 40-64: CPT | Performed by: STUDENT IN AN ORGANIZED HEALTH CARE EDUCATION/TRAINING PROGRAM

## 2022-02-07 PROCEDURE — 99214 OFFICE O/P EST MOD 30 MIN: CPT | Performed by: STUDENT IN AN ORGANIZED HEALTH CARE EDUCATION/TRAINING PROGRAM

## 2022-02-07 RX ORDER — AMLODIPINE BESYLATE 5 MG/1
5 TABLET ORAL DAILY
Qty: 30 TABLET | Refills: 5 | Status: SHIPPED | OUTPATIENT
Start: 2022-02-07 | End: 2022-02-10 | Stop reason: SDUPTHER

## 2022-02-07 RX ORDER — LANOLIN ALCOHOL/MO/W.PET/CERES
3 CREAM (GRAM) TOPICAL DAILY
Qty: 30 TABLET | Refills: 3 | Status: SHIPPED | OUTPATIENT
Start: 2022-02-07 | End: 2022-02-10 | Stop reason: SDUPTHER

## 2022-02-07 SDOH — ECONOMIC STABILITY: FOOD INSECURITY: WITHIN THE PAST 12 MONTHS, THE FOOD YOU BOUGHT JUST DIDN'T LAST AND YOU DIDN'T HAVE MONEY TO GET MORE.: NEVER TRUE

## 2022-02-07 SDOH — ECONOMIC STABILITY: FOOD INSECURITY: WITHIN THE PAST 12 MONTHS, YOU WORRIED THAT YOUR FOOD WOULD RUN OUT BEFORE YOU GOT MONEY TO BUY MORE.: NEVER TRUE

## 2022-02-07 ASSESSMENT — ENCOUNTER SYMPTOMS
BLOOD IN STOOL: 0
SHORTNESS OF BREATH: 0
COUGH: 0

## 2022-02-07 ASSESSMENT — PATIENT HEALTH QUESTIONNAIRE - PHQ9
SUM OF ALL RESPONSES TO PHQ QUESTIONS 1-9: 0
2. FEELING DOWN, DEPRESSED OR HOPELESS: 0
SUM OF ALL RESPONSES TO PHQ QUESTIONS 1-9: 0
3. TROUBLE FALLING OR STAYING ASLEEP: 0
6. FEELING BAD ABOUT YOURSELF - OR THAT YOU ARE A FAILURE OR HAVE LET YOURSELF OR YOUR FAMILY DOWN: 0
4. FEELING TIRED OR HAVING LITTLE ENERGY: 0
5. POOR APPETITE OR OVEREATING: 0
SUM OF ALL RESPONSES TO PHQ9 QUESTIONS 1 & 2: 0
10. IF YOU CHECKED OFF ANY PROBLEMS, HOW DIFFICULT HAVE THESE PROBLEMS MADE IT FOR YOU TO DO YOUR WORK, TAKE CARE OF THINGS AT HOME, OR GET ALONG WITH OTHER PEOPLE: 0
1. LITTLE INTEREST OR PLEASURE IN DOING THINGS: 0
9. THOUGHTS THAT YOU WOULD BE BETTER OFF DEAD, OR OF HURTING YOURSELF: 0
SUM OF ALL RESPONSES TO PHQ QUESTIONS 1-9: 0
SUM OF ALL RESPONSES TO PHQ QUESTIONS 1-9: 0
7. TROUBLE CONCENTRATING ON THINGS, SUCH AS READING THE NEWSPAPER OR WATCHING TELEVISION: 0
8. MOVING OR SPEAKING SO SLOWLY THAT OTHER PEOPLE COULD HAVE NOTICED. OR THE OPPOSITE, BEING SO FIGETY OR RESTLESS THAT YOU HAVE BEEN MOVING AROUND A LOT MORE THAN USUAL: 0

## 2022-02-07 ASSESSMENT — SOCIAL DETERMINANTS OF HEALTH (SDOH): HOW HARD IS IT FOR YOU TO PAY FOR THE VERY BASICS LIKE FOOD, HOUSING, MEDICAL CARE, AND HEATING?: NOT HARD AT ALL

## 2022-02-09 ENCOUNTER — TELEPHONE (OUTPATIENT)
Dept: PRIMARY CARE CLINIC | Age: 62
End: 2022-02-09

## 2022-02-09 DIAGNOSIS — I10 ESSENTIAL HYPERTENSION: ICD-10-CM

## 2022-02-09 RX ORDER — LANOLIN ALCOHOL/MO/W.PET/CERES
3 CREAM (GRAM) TOPICAL DAILY
Qty: 30 TABLET | Refills: 3 | Status: CANCELLED | OUTPATIENT
Start: 2022-02-09

## 2022-02-09 RX ORDER — AMLODIPINE BESYLATE 5 MG/1
5 TABLET ORAL DAILY
Qty: 30 TABLET | Refills: 5 | Status: CANCELLED | OUTPATIENT
Start: 2022-02-09

## 2022-02-09 NOTE — TELEPHONE ENCOUNTER
Medication:   Requested Prescriptions     Pending Prescriptions Disp Refills    melatonin 3 MG TABS tablet 30 tablet 3     Sig: Take 1 tablet by mouth daily    amLODIPine (NORVASC) 5 MG tablet 30 tablet 5     Sig: Take 1 tablet by mouth daily     Last Filled:  02/07/22 sent to wrong pharm    Patient Phone Number: [phone] (home)     Last appt: 2/7/2022   Next appt: 2/21/2022    Last OARRS: No flowsheet data found.

## 2022-02-10 DIAGNOSIS — I10 ESSENTIAL HYPERTENSION: ICD-10-CM

## 2022-02-10 RX ORDER — LANOLIN ALCOHOL/MO/W.PET/CERES
3 CREAM (GRAM) TOPICAL DAILY
Qty: 30 TABLET | Refills: 3 | Status: SHIPPED | OUTPATIENT
Start: 2022-02-10 | End: 2022-02-11 | Stop reason: SDUPTHER

## 2022-02-10 RX ORDER — AMLODIPINE BESYLATE 5 MG/1
5 TABLET ORAL DAILY
Qty: 30 TABLET | Refills: 5 | Status: SHIPPED | OUTPATIENT
Start: 2022-02-10 | End: 2022-02-11 | Stop reason: SDUPTHER

## 2022-02-11 ENCOUNTER — TELEPHONE (OUTPATIENT)
Dept: PRIMARY CARE CLINIC | Age: 62
End: 2022-02-11

## 2022-02-11 DIAGNOSIS — I10 ESSENTIAL HYPERTENSION: ICD-10-CM

## 2022-02-11 RX ORDER — LANOLIN ALCOHOL/MO/W.PET/CERES
3 CREAM (GRAM) TOPICAL DAILY
Qty: 30 TABLET | Refills: 3 | Status: SHIPPED | OUTPATIENT
Start: 2022-02-11 | End: 2022-03-22 | Stop reason: SDUPTHER

## 2022-02-11 RX ORDER — AMLODIPINE BESYLATE 5 MG/1
5 TABLET ORAL DAILY
Qty: 30 TABLET | Refills: 5 | Status: SHIPPED | OUTPATIENT
Start: 2022-02-11 | End: 2022-02-23 | Stop reason: SDUPTHER

## 2022-02-11 NOTE — TELEPHONE ENCOUNTER
Patient called and said that [address] [place] [address] will not accept his prescriptions. Patient would like prescriptions sent to:  [facility] at [facility]. [address] [facility]  Until they can find another pharmacy that delivers. Please resend prescriptions.

## 2022-02-11 NOTE — TELEPHONE ENCOUNTER
Medication:   Requested Prescriptions     Pending Prescriptions Disp Refills    melatonin 3 MG TABS tablet 30 tablet 3     Sig: Take 1 tablet by mouth daily    amLODIPine (NORVASC) 5 MG tablet 30 tablet 5     Sig: Take 1 tablet by mouth daily        Last Filled:  02/10/22    Patient Phone Number: [phone] (home)     Last appt: 2/7/2022   Next appt: 2/21/2022    Last OARRS: No flowsheet data found.

## 2022-02-23 ENCOUNTER — TELEPHONE (OUTPATIENT)
Dept: PRIMARY CARE CLINIC | Age: 62
End: 2022-02-23

## 2022-02-23 ENCOUNTER — OFFICE VISIT (OUTPATIENT)
Dept: PRIMARY CARE CLINIC | Age: 62
End: 2022-02-23
Payer: COMMERCIAL

## 2022-02-23 VITALS
HEART RATE: 75 BPM | HEIGHT: 67 IN | BODY MASS INDEX: 21.35 KG/M2 | SYSTOLIC BLOOD PRESSURE: 133 MMHG | OXYGEN SATURATION: 94 % | DIASTOLIC BLOOD PRESSURE: 76 MMHG | TEMPERATURE: 98 F | WEIGHT: 136 LBS | RESPIRATION RATE: 16 BRPM

## 2022-02-23 DIAGNOSIS — Z13.220 SCREENING FOR HYPERLIPIDEMIA: ICD-10-CM

## 2022-02-23 DIAGNOSIS — I10 ESSENTIAL HYPERTENSION: Primary | ICD-10-CM

## 2022-02-23 DIAGNOSIS — Z13.1 SCREENING FOR DIABETES MELLITUS: ICD-10-CM

## 2022-02-23 DIAGNOSIS — I10 ESSENTIAL HYPERTENSION: ICD-10-CM

## 2022-02-23 DIAGNOSIS — Z76.89 ENCOUNTER TO ESTABLISH CARE WITH NEW DOCTOR: ICD-10-CM

## 2022-02-23 DIAGNOSIS — Z23 NEED FOR PROPHYLACTIC VACCINATION AGAINST STREPTOCOCCUS PNEUMONIAE (PNEUMOCOCCUS): ICD-10-CM

## 2022-02-23 DIAGNOSIS — Z11.4 SCREENING FOR HIV (HUMAN IMMUNODEFICIENCY VIRUS): ICD-10-CM

## 2022-02-23 DIAGNOSIS — Z11.59 ENCOUNTER FOR HEPATITIS C SCREENING TEST FOR LOW RISK PATIENT: ICD-10-CM

## 2022-02-23 DIAGNOSIS — F17.210 CIGARETTE NICOTINE DEPENDENCE WITHOUT COMPLICATION: ICD-10-CM

## 2022-02-23 DIAGNOSIS — I69.359 HISTORY OF HEMORRHAGIC STROKE WITH RESIDUAL HEMIPLEGIA (HCC): Primary | ICD-10-CM

## 2022-02-23 DIAGNOSIS — G25.9 MOVEMENT DISORDER: ICD-10-CM

## 2022-02-23 PROCEDURE — 90732 PPSV23 VACC 2 YRS+ SUBQ/IM: CPT | Performed by: STUDENT IN AN ORGANIZED HEALTH CARE EDUCATION/TRAINING PROGRAM

## 2022-02-23 PROCEDURE — G8420 CALC BMI NORM PARAMETERS: HCPCS | Performed by: STUDENT IN AN ORGANIZED HEALTH CARE EDUCATION/TRAINING PROGRAM

## 2022-02-23 PROCEDURE — 3017F COLORECTAL CA SCREEN DOC REV: CPT | Performed by: STUDENT IN AN ORGANIZED HEALTH CARE EDUCATION/TRAINING PROGRAM

## 2022-02-23 PROCEDURE — G8427 DOCREV CUR MEDS BY ELIG CLIN: HCPCS | Performed by: STUDENT IN AN ORGANIZED HEALTH CARE EDUCATION/TRAINING PROGRAM

## 2022-02-23 PROCEDURE — G8484 FLU IMMUNIZE NO ADMIN: HCPCS | Performed by: STUDENT IN AN ORGANIZED HEALTH CARE EDUCATION/TRAINING PROGRAM

## 2022-02-23 PROCEDURE — 4004F PT TOBACCO SCREEN RCVD TLK: CPT | Performed by: STUDENT IN AN ORGANIZED HEALTH CARE EDUCATION/TRAINING PROGRAM

## 2022-02-23 PROCEDURE — 99214 OFFICE O/P EST MOD 30 MIN: CPT | Performed by: STUDENT IN AN ORGANIZED HEALTH CARE EDUCATION/TRAINING PROGRAM

## 2022-02-23 RX ORDER — AMLODIPINE BESYLATE 5 MG/1
5 TABLET ORAL DAILY
Qty: 30 TABLET | Refills: 5 | Status: SHIPPED | OUTPATIENT
Start: 2022-02-23 | End: 2022-03-22 | Stop reason: SDUPTHER

## 2022-02-23 ASSESSMENT — ENCOUNTER SYMPTOMS
SHORTNESS OF BREATH: 0
CHEST TIGHTNESS: 0
COUGH: 0

## 2022-02-23 NOTE — PROGRESS NOTES
2022     Jeffrey Murguia (:  1960) is a 62 y.o. male, here for evaluation of the following medical concerns:    HPI  Hypertension:  Home blood pressure monitoring: No.  He is not adherent to a low sodium diet. Patient denies chest pain, shortness of breath, headache, lightheadedness, blurred vision, peripheral edema, palpitations, dry cough and fatigue. Antihypertensive medication side effects: no medication side effects noted. Use of agents associated with hypertension: none. No results found for: NA No results found for: BUN No results found for: GLUCOSE   No results found for: K No results found for: CREATININE        Review of Systems   Constitutional: Negative for fatigue. Eyes: Negative for visual disturbance. Respiratory: Negative for cough, chest tightness and shortness of breath. Cardiovascular: Negative for chest pain, palpitations and leg swelling. Endocrine: Negative for polydipsia, polyphagia and polyuria. Genitourinary: Negative for frequency. Skin: Negative for rash. Neurological: Negative for dizziness, syncope, weakness and light-headedness. Prior to Visit Medications    Medication Sig Taking?  Authorizing Provider   amLODIPine (NORVASC) 5 MG tablet Take 1 tablet by mouth daily Yes Serene Oconnor DO   melatonin 3 MG TABS tablet Take 1 tablet by mouth daily Yes Serene Oconnor, DO        Social History     Tobacco Use    Smoking status: Current Every Day Smoker     Packs/day: 1.00     Years: 40.00     Pack years: 40.00     Types: Cigarettes    Smokeless tobacco: Never Used   Substance Use Topics    Alcohol use: Never        Vitals:    22 1229   BP: 133/76   Site: Left Upper Arm   Position: Sitting   Cuff Size: Small Adult   Pulse: 75   Resp: 16   Temp: 98 °F (36.7 °C)   TempSrc: Temporal   SpO2: 94%   Weight: 136 lb (61.7 kg)   Height: 5' 7\" (1.702 m)     Estimated body mass index is 21.3 kg/m² as calculated from the following:    Height as of this encounter: 5' 7\" (1.702 m). Weight as of this encounter: 136 lb (61.7 kg). Physical Exam  Vitals reviewed. Constitutional:       Appearance: Normal appearance. He is normal weight. HENT:      Head: Normocephalic and atraumatic. Nose: Nose normal.      Mouth/Throat:      Mouth: Mucous membranes are moist.      Pharynx: Oropharynx is clear. Eyes:      Extraocular Movements: Extraocular movements intact. Conjunctiva/sclera: Conjunctivae normal.   Cardiovascular:      Rate and Rhythm: Normal rate and regular rhythm. Pulses: Normal pulses. Heart sounds: Normal heart sounds. Pulmonary:      Effort: Pulmonary effort is normal.      Breath sounds: Normal breath sounds. Abdominal:      General: Abdomen is flat. Bowel sounds are normal.      Palpations: Abdomen is soft. Musculoskeletal:         General: Normal range of motion. Cervical back: Normal range of motion and neck supple. Skin:     General: Skin is warm and dry. Capillary Refill: Capillary refill takes less than 2 seconds. Findings: No rash. Neurological:      Mental Status: He is alert and oriented to person, place, and time. Psychiatric:         Mood and Affect: Mood normal.         Behavior: Behavior normal.         Thought Content: Thought content normal.         Judgment: Judgment normal.       ASSESSMENT/PLAN:  1. Essential hypertension: Blood pressure much better today. No side effects from amlodipine. Had his blood work done this morning. We will touch base once results. Okay with follow-up in 6 months. - amLODIPine (NORVASC) 5 MG tablet; Take 1 tablet by mouth daily  Dispense: 30 tablet; Refill: 5    2. Cigarette nicotine dependence without complication: Discussed negative health effects of cigarette smoking in great detail with patient. Currently smokes 10 cigarettes/day.   Discussed different options for assistance with cessation; however, patient is not interested in assistance with cessation today. > 3 min  - [place] TOBACCO USE CESSATION INTERMEDIATE 3-10 MINUTES    3. Movement disorder: Has an appointment scheduled in August.  Will reach out to neurology and see if can get him in sooner. 4. Need for prophylactic vaccination against Streptococcus pneumoniae (pneumococcus)  - Pneumococcal polysaccharide vaccine 23-valent PPSV23      Return in about 6 months (around 8/23/2022) for Blood Pressure. An electronic signature was used to authenticate this note.     --Chata Santos,  on 2/23/2022 at 1:41 PM

## 2022-02-23 NOTE — PATIENT INSTRUCTIONS
DASH Diet: Care Instructions  Your Care Instructions     The DASH diet is an eating plan that can help lower your blood pressure. DASH stands for Dietary Approaches to Stop Hypertension. Hypertension is high blood pressure. The DASH diet focuses on eating foods that are high in calcium, potassium, and magnesium. These nutrients can lower blood pressure. The foods that are highest in these nutrients are fruits, vegetables, low-fat dairy products, nuts, seeds, and legumes. But taking calcium, potassium, and magnesium supplements instead of eating foods that are high in those nutrients does not have the same effect. The DASH diet also includes whole grains, fish, and poultry. The DASH diet is one of several lifestyle changes your doctor may recommend to lower your high blood pressure. Your doctor may also want you to decrease the amount of sodium in your diet. Lowering sodium while following the DASH diet can lower blood pressure even further than just the DASH diet alone. Follow-up care is a key part of your treatment and safety. Be sure to make and go to all appointments, and call your doctor if you are having problems. It's also a good idea to know your test results and keep a list of the medicines you take. How can you care for yourself at home? Following the DASH diet  · Eat 4 to 5 servings of fruit each day. A serving is 1 medium-sized piece of fruit, ½ cup chopped or canned fruit, 1/4 cup dried fruit, or 4 ounces (½ cup) of fruit juice. Choose fruit more often than fruit juice. · Eat 4 to 5 servings of vegetables each day. A serving is 1 cup of lettuce or raw leafy vegetables, ½ cup of chopped or cooked vegetables, or 4 ounces (½ cup) of vegetable juice. Choose vegetables more often than vegetable juice. · Get 2 to 3 servings of low-fat and fat-free dairy each day. A serving is 8 ounces of milk, 1 cup of yogurt, or 1 ½ ounces of cheese. · Eat 6 to 8 servings of grains each day.  A serving is 1 slice of bread, 1 ounce of dry cereal, or ½ cup of cooked rice, pasta, or cooked cereal. Try to choose whole-grain products as much as possible. · Limit lean meat, poultry, and fish to 2 servings each day. A serving is 3 ounces, about the size of a deck of cards. · Eat 4 to 5 servings of nuts, seeds, and legumes (cooked dried beans, lentils, and split peas) each week. A serving is 1/3 cup of nuts, 2 tablespoons of seeds, or ½ cup of cooked beans or peas. · Limit fats and oils to 2 to 3 servings each day. A serving is 1 teaspoon of vegetable oil or 2 tablespoons of salad dressing. · Limit sweets and added sugars to 5 servings or less a week. A serving is 1 tablespoon jelly or jam, ½ cup sorbet, or 1 cup of lemonade. · Eat less than 2,300 milligrams (mg) of sodium a day. If you limit your sodium to 1,500 mg a day, you can lower your blood pressure even more. Tips for success  · Start small. Do not try to make dramatic changes to your diet all at once. You might feel that you are missing out on your favorite foods and then be more likely to not follow the plan. Make small changes, and stick with them. Once those changes become habit, add a few more changes. · Try some of the following:  ? Make it a goal to eat a fruit or vegetable at every meal and at snacks. This will make it easy to get the recommended amount of fruits and vegetables each day. ? Try yogurt topped with fruit and nuts for a snack or healthy dessert. ? Add lettuce, tomato, cucumber, and onion to sandwiches. ? Combine a ready-made pizza crust with low-fat mozzarella cheese and lots of vegetable toppings. Try using tomatoes, squash, spinach, broccoli, carrots, cauliflower, and onions. ? Have a variety of cut-up vegetables with a low-fat dip as an appetizer instead of chips and dip. ? Sprinkle sunflower seeds or chopped almonds over salads. Or try adding chopped walnuts or almonds to cooked vegetables. ? Try some vegetarian meals using beans and peas. Add garbanzo or kidney beans to salads. Make burritos and tacos with mashed morris beans or black beans. Where can you learn more? Go to https://[employer]. org and sign in to your [employer] account. Enter E795 in the [facility] box to learn more about \"DASH Diet: Care Instructions. \"     If you do not have an account, please click on the \"Sign Up Now\" link. Current as of: December 16, 2019               Content Version: 12.5  © 4980-8500 Healthwise, Incorporated. Care instructions adapted under license by [facility] ([facility]). If you have questions about a medical condition or this instruction, always ask your healthcare professional. [address] any warranty or liability for your use of this information.

## 2022-02-23 NOTE — PROGRESS NOTES
Immunization(s) given during visit:     Immunizations Administered     Name Date Dose Route    Pneumococcal Polysaccharide ([id number]) 2/23/2022 0.5 mL Intramuscular    Site: Deltoid- Left    Lot: [id number]    NDC: 3176-7171-40           Patient instructed to remain in clinic for 20 minutes after injection and was advised to report any adverse reaction to me immediately.

## 2022-02-24 LAB
A/G RATIO: 1.7 (ref 1.1–2.2)
ALBUMIN SERPL-MCNC: 4.6 G/DL (ref 3.4–5)
ALP BLD-CCNC: 84 U/L (ref 40–129)
ALT SERPL-CCNC: 12 U/L (ref 10–40)
ANION GAP SERPL CALCULATED.3IONS-SCNC: 20 MMOL/L (ref 3–16)
AST SERPL-CCNC: 20 U/L (ref 15–37)
BILIRUB SERPL-MCNC: 0.5 MG/DL (ref 0–1)
BUN BLDV-MCNC: 18 MG/DL (ref 7–20)
CALCIUM SERPL-MCNC: 9.6 MG/DL (ref 8.3–10.6)
CHLORIDE BLD-SCNC: 107 MMOL/L (ref 99–110)
CHOLESTEROL, FASTING: 200 MG/DL (ref 0–199)
CO2: 17 MMOL/L (ref 21–32)
CREAT SERPL-MCNC: 1 MG/DL (ref 0.8–1.3)
GFR AFRICAN AMERICAN: >60
GFR NON-AFRICAN AMERICAN: >60
GLUCOSE BLD-MCNC: 102 MG/DL (ref 70–99)
HDLC SERPL-MCNC: 65 MG/DL (ref 40–60)
HEPATITIS C ANTIBODY INTERPRETATION: NORMAL
HIV AG/AB: NORMAL
HIV ANTIGEN: NORMAL
HIV-1 ANTIBODY: NORMAL
HIV-2 AB: NORMAL
LDL CHOLESTEROL CALCULATED: 115 MG/DL
POTASSIUM SERPL-SCNC: 4.5 MMOL/L (ref 3.5–5.1)
SODIUM BLD-SCNC: 144 MMOL/L (ref 136–145)
TOTAL PROTEIN: 7.3 G/DL (ref 6.4–8.2)
TRIGLYCERIDE, FASTING: 99 MG/DL (ref 0–150)
TSH REFLEX: 0.77 UIU/ML (ref 0.27–4.2)
VLDLC SERPL CALC-MCNC: 20 MG/DL

## 2022-02-24 NOTE — TELEPHONE ENCOUNTER
Frankie calling pt daughter to try and see if she already has a company she wanted to use to fax order no answer could not leave voice mail due to mail box bring full will try again later

## 2022-02-28 ENCOUNTER — TELEPHONE (OUTPATIENT)
Dept: PRIMARY CARE CLINIC | Age: 62
End: 2022-02-28

## 2022-02-28 NOTE — TELEPHONE ENCOUNTER
----- Message from Chata Santos DO sent at 2/25/2022 12:01 PM EST -----  Please call and schedule follow-up appointment in the next month. I would like to discuss his lab work with him.

## 2022-03-20 PROBLEM — E78.2 MIXED HYPERLIPIDEMIA: Status: ACTIVE | Noted: 2022-03-20

## 2022-03-20 NOTE — PATIENT INSTRUCTIONS
are trying to quit smoking. · Consider signing up for a smoking cessation program, such as the American Lung Association's Freedom from Smoking program.  · Get text messaging support. Go to the website at www.smokefree. gov to sign up for the [facility] program.  · Set a quit date. Pick your date carefully so that it is not right in the middle of a big deadline or stressful time. Once you quit, do not even take a puff. Get rid of all ashtrays and lighters after your last cigarette. Clean your house and your clothes so that they do not smell of smoke. · Learn how to be a nonsmoker. Think about ways you can avoid those things that make you reach for a cigarette. ? Avoid situations that put you at greatest risk for smoking. For some people, it is hard to have a drink with friends without smoking. For others, they might skip a coffee break with coworkers who smoke. ? Change your daily routine. Take a different route to work or eat a meal in a different place. · Cut down on stress. Calm yourself or release tension by doing an activity you enjoy, such as reading a book, taking a hot bath, or gardening. · Talk to your doctor or pharmacist about nicotine replacement therapy, which replaces the nicotine in your body. You still get nicotine but you do not use tobacco. Nicotine replacement products help you slowly reduce the amount of nicotine you need. These products come in several forms, many of them available over-the-counter:  ? Nicotine patches  ? Nicotine gum and lozenges  ? Nicotine inhaler  · Ask your doctor about bupropion (Wellbutrin) or varenicline (Chantix), which are prescription medicines. They do not contain nicotine. They help you by reducing withdrawal symptoms, such as stress and anxiety. · Some people find hypnosis, acupuncture, and massage helpful for ending the smoking habit. · Eat a healthy diet and get regular exercise.  Having healthy habits will help your body move past its craving for nicotine. · Be prepared to keep trying. Most people are not successful the first few times they try to quit. Do not get mad at yourself if you smoke again. Make a list of things you learned and think about when you want to try again, such as next week, next month, or next year. Where can you learn more? Go to https://[employer]angelica[employer]all.[employer]. org and sign in to your [employer] account. Enter U677 in the [employer] box to learn more about \"Stopping Smoking: Care Instructions. \"     If you do not have an account, please click on the \"Sign Up Now\" link. Current as of: February 11, 2021               Content Version: 13.1  © 2006-2021 Healthwise, Incorporated. Care instructions adapted under license by [facility] ([facility]). If you have questions about a medical condition or this instruction, always ask your healthcare professional. [address] any warranty or liability for your use of this information.

## 2022-03-20 NOTE — PROGRESS NOTES
3/21/2022     Yajaira Moore (:  1960) is a 62 y.o. male, here for evaluation of the following medical concerns:    HPI  Hyperlipidemia:  Patient is not following a low fat, low cholesterol diet. He is not exercising regularly. OTC Supplements: none. Lab Results   Component Value Date    HDL 65 (H) 2022    LDLCALC 115 (H) 2022     Lab Results   Component Value Date    ALT 12 2022    AST 20 2022        Hypertension:  Home blood pressure monitoring: No.  He is not adherent to a low sodium diet. Patient denies chest pain, shortness of breath, headache, lightheadedness, blurred vision, peripheral edema, palpitations, dry cough and fatigue. Antihypertensive medication side effects: no medication side effects noted. Use of agents associated with hypertension: none. Sodium (mmol/L)   Date Value   2022 144    BUN (mg/dL)   Date Value   2022 18    Glucose (mg/dL)   Date Value   2022 102 (H)      Potassium (mmol/L)   Date Value   2022 4.5    CREATININE (mg/dL)   Date Value   2022 1.0           Review of Systems   Constitutional: Negative for fatigue. Eyes: Negative for visual disturbance. Respiratory: Negative for cough, chest tightness and shortness of breath. Cardiovascular: Negative for chest pain, palpitations and leg swelling. Endocrine: Negative for polydipsia, polyphagia and polyuria. Genitourinary: Negative for frequency. Skin: Negative for rash. Neurological: Negative for dizziness, syncope, weakness and light-headedness. Prior to Visit Medications    Medication Sig Taking?  Authorizing Provider   simvastatin (ZOCOR) 40 MG tablet Take 1 tablet by mouth nightly Yes Vinay Jorge Alberto, DO   amLODIPine (NORVASC) 5 MG tablet Take 1 tablet by mouth daily Yes Vinay Jorge Alberto, DO   melatonin 3 MG TABS tablet Take 1 tablet by mouth daily Yes Vinay Jorge Alberto, DO        Social History     Tobacco Use    Smoking status: Current Every Day Smoker     Packs/day: 1.00     Years: 40.00     Pack years: 40.00     Types: Cigarettes    Smokeless tobacco: Never Used   Substance Use Topics    Alcohol use: Never        Vitals:    03/21/22 1310   BP: 136/82   Pulse: 71   Temp: 97.2 °F (36.2 °C)   TempSrc: Axillary   Weight: 136 lb (61.7 kg)   Height: 5' 7\" (1.702 m)     Estimated body mass index is 21.3 kg/m² as calculated from the following:    Height as of this encounter: 5' 7\" (1.702 m). Weight as of this encounter: 136 lb (61.7 kg). Physical Exam  Vitals reviewed. Constitutional:       Appearance: Normal appearance. He is normal weight. HENT:      Head: Normocephalic and atraumatic. Nose: Nose normal.      Mouth/Throat:      Mouth: Mucous membranes are moist.      Pharynx: Oropharynx is clear. Eyes:      Extraocular Movements: Extraocular movements intact. Conjunctiva/sclera: Conjunctivae normal.   Cardiovascular:      Rate and Rhythm: Normal rate and regular rhythm. Pulses: Normal pulses. Heart sounds: Normal heart sounds. Pulmonary:      Effort: Pulmonary effort is normal.      Breath sounds: Normal breath sounds. Abdominal:      General: Abdomen is flat. Bowel sounds are normal.      Palpations: Abdomen is soft. Musculoskeletal:         General: Normal range of motion. Cervical back: Normal range of motion and neck supple. Skin:     General: Skin is warm and dry. Capillary Refill: Capillary refill takes less than 2 seconds. Findings: No rash. Neurological:      Mental Status: He is alert and oriented to person, place, and time. Comments: Cont movement LUE, L lower face (baseline)   Psychiatric:         Mood and Affect: Mood normal.         Behavior: Behavior normal.         Thought Content: Thought content normal.         Judgment: Judgment normal.         ASSESSMENT/PLAN:  1. Essential hypertension: Blood pressure remains well controlled today.   Tolerating amlodipine well without side effects. 2. Mixed hyperlipidemia: Reviewed lipid panel with patient. ASCVD 28%. Discussed appropriate lifestyle modifications and statin therapy for reducing risk. Patient is receptive. Starting Zocor today. Update lipid profile 1 year. - simvastatin (ZOCOR) 40 MG tablet; Take 1 tablet by mouth nightly  Dispense: 30 tablet; Refill: 5    3. Cigarette nicotine dependence without complication: Definitely contributes to elevated ASCVD. Discussed this with patient and he continues to work on cessation. Return in about 6 months (around 9/21/2022). An electronic signature was used to authenticate this note.     --Justus Maier, DO on 3/21/2022 at 2:07 PM

## 2022-03-21 ENCOUNTER — OFFICE VISIT (OUTPATIENT)
Dept: PRIMARY CARE CLINIC | Age: 62
End: 2022-03-21
Payer: COMMERCIAL

## 2022-03-21 VITALS
WEIGHT: 136 LBS | BODY MASS INDEX: 21.35 KG/M2 | SYSTOLIC BLOOD PRESSURE: 136 MMHG | TEMPERATURE: 97.2 F | HEART RATE: 71 BPM | HEIGHT: 67 IN | DIASTOLIC BLOOD PRESSURE: 82 MMHG

## 2022-03-21 DIAGNOSIS — F17.210 CIGARETTE NICOTINE DEPENDENCE WITHOUT COMPLICATION: ICD-10-CM

## 2022-03-21 DIAGNOSIS — E78.2 MIXED HYPERLIPIDEMIA: ICD-10-CM

## 2022-03-21 DIAGNOSIS — I10 ESSENTIAL HYPERTENSION: Primary | ICD-10-CM

## 2022-03-21 PROCEDURE — G8427 DOCREV CUR MEDS BY ELIG CLIN: HCPCS | Performed by: STUDENT IN AN ORGANIZED HEALTH CARE EDUCATION/TRAINING PROGRAM

## 2022-03-21 PROCEDURE — G8420 CALC BMI NORM PARAMETERS: HCPCS | Performed by: STUDENT IN AN ORGANIZED HEALTH CARE EDUCATION/TRAINING PROGRAM

## 2022-03-21 PROCEDURE — 99214 OFFICE O/P EST MOD 30 MIN: CPT | Performed by: STUDENT IN AN ORGANIZED HEALTH CARE EDUCATION/TRAINING PROGRAM

## 2022-03-21 PROCEDURE — 4004F PT TOBACCO SCREEN RCVD TLK: CPT | Performed by: STUDENT IN AN ORGANIZED HEALTH CARE EDUCATION/TRAINING PROGRAM

## 2022-03-21 PROCEDURE — G8484 FLU IMMUNIZE NO ADMIN: HCPCS | Performed by: STUDENT IN AN ORGANIZED HEALTH CARE EDUCATION/TRAINING PROGRAM

## 2022-03-21 PROCEDURE — 3017F COLORECTAL CA SCREEN DOC REV: CPT | Performed by: STUDENT IN AN ORGANIZED HEALTH CARE EDUCATION/TRAINING PROGRAM

## 2022-03-21 RX ORDER — SIMVASTATIN 40 MG
40 TABLET ORAL NIGHTLY
Qty: 30 TABLET | Refills: 5 | Status: SHIPPED | OUTPATIENT
Start: 2022-03-21 | End: 2022-03-22 | Stop reason: SDUPTHER

## 2022-03-21 ASSESSMENT — ENCOUNTER SYMPTOMS
SHORTNESS OF BREATH: 0
COUGH: 0
CHEST TIGHTNESS: 0

## 2022-03-22 ENCOUNTER — TELEPHONE (OUTPATIENT)
Dept: PRIMARY CARE CLINIC | Age: 62
End: 2022-03-22

## 2022-03-22 DIAGNOSIS — I10 ESSENTIAL HYPERTENSION: ICD-10-CM

## 2022-03-22 DIAGNOSIS — E78.2 MIXED HYPERLIPIDEMIA: ICD-10-CM

## 2022-03-22 RX ORDER — LANOLIN ALCOHOL/MO/W.PET/CERES
3 CREAM (GRAM) TOPICAL DAILY
Qty: 30 TABLET | Refills: 3 | Status: SHIPPED | OUTPATIENT
Start: 2022-03-22

## 2022-03-22 RX ORDER — SIMVASTATIN 40 MG
40 TABLET ORAL NIGHTLY
Qty: 30 TABLET | Refills: 5 | Status: SHIPPED | OUTPATIENT
Start: 2022-03-22 | End: 2022-09-07 | Stop reason: SDUPTHER

## 2022-03-22 RX ORDER — AMLODIPINE BESYLATE 5 MG/1
5 TABLET ORAL DAILY
Qty: 30 TABLET | Refills: 5 | Status: SHIPPED | OUTPATIENT
Start: 2022-03-22 | End: 2022-09-07

## 2022-03-22 NOTE — TELEPHONE ENCOUNTER
Medication:   Requested Prescriptions     Pending Prescriptions Disp Refills    melatonin 3 MG TABS tablet 30 tablet 3     Sig: Take 1 tablet by mouth daily    simvastatin (ZOCOR) 40 MG tablet 30 tablet 5     Sig: Take 1 tablet by mouth nightly    amLODIPine (NORVASC) 5 MG tablet 30 tablet 5     Sig: Take 1 tablet by mouth daily      02/11/22, 02/23/22, 03/21/22   Last Filled:      Patient Phone Number: [phone] (home)     Last appt: 3/21/2022 Return in about 6 months (around 9/21/2022). Patient Education        Next appt: Visit date not found    Last OARRS: No flowsheet data found.

## 2022-03-22 NOTE — TELEPHONE ENCOUNTER
simvastatin (ZOCOR) 40 MG tablet     Address: [address]  Departments: [facility]    Pt would like this and all meds. Sent to the above address.

## 2022-03-29 ENCOUNTER — TELEPHONE (OUTPATIENT)
Dept: PRIMARY CARE CLINIC | Age: 62
End: 2022-03-29

## 2022-03-29 NOTE — TELEPHONE ENCOUNTER
Rozanna Cranker from [employer] called she faxed over paperwork for home healthcare on the 18 th they haven't received it back. Would like to know status.

## 2022-05-03 ENCOUNTER — TELEPHONE (OUTPATIENT)
Dept: CASE MANAGEMENT | Age: 62
End: 2022-05-03

## 2022-05-03 NOTE — TELEPHONE ENCOUNTER
Pt was not able to attend scheduled CT Lung Screening. Letter mailed to pt with information for rescheduling.       Balbir Saul BSN, RN   Lung Nodule Navigator  [facility] [employer]  [phone]

## 2022-07-17 ENCOUNTER — HEALTH MAINTENANCE LETTER (OUTPATIENT)
Age: 62
End: 2022-07-17

## 2022-09-07 ENCOUNTER — OFFICE VISIT (OUTPATIENT)
Dept: PRIMARY CARE CLINIC | Age: 62
End: 2022-09-07
Payer: MEDICAID

## 2022-09-07 VITALS
HEART RATE: 75 BPM | HEIGHT: 67 IN | DIASTOLIC BLOOD PRESSURE: 96 MMHG | WEIGHT: 135.66 LBS | TEMPERATURE: 97.3 F | BODY MASS INDEX: 21.29 KG/M2 | SYSTOLIC BLOOD PRESSURE: 151 MMHG

## 2022-09-07 DIAGNOSIS — Z12.11 SCREEN FOR COLON CANCER: ICD-10-CM

## 2022-09-07 DIAGNOSIS — R25.8 CHOREIC MOVEMENTS: ICD-10-CM

## 2022-09-07 DIAGNOSIS — F17.210 CIGARETTE NICOTINE DEPENDENCE WITHOUT COMPLICATION: ICD-10-CM

## 2022-09-07 DIAGNOSIS — I10 ESSENTIAL HYPERTENSION: Primary | ICD-10-CM

## 2022-09-07 DIAGNOSIS — Z12.2 SCREENING FOR LUNG CANCER: ICD-10-CM

## 2022-09-07 DIAGNOSIS — E78.2 MIXED HYPERLIPIDEMIA: ICD-10-CM

## 2022-09-07 LAB
A/G RATIO: 1.6 (ref 1.1–2.2)
ALBUMIN SERPL-MCNC: 4.2 G/DL (ref 3.4–5)
ALP BLD-CCNC: 92 U/L (ref 40–129)
ALT SERPL-CCNC: 26 U/L (ref 10–40)
ANION GAP SERPL CALCULATED.3IONS-SCNC: 9 MMOL/L (ref 3–16)
AST SERPL-CCNC: 22 U/L (ref 15–37)
BILIRUB SERPL-MCNC: 0.5 MG/DL (ref 0–1)
BUN BLDV-MCNC: 14 MG/DL (ref 7–20)
CALCIUM SERPL-MCNC: 9.2 MG/DL (ref 8.3–10.6)
CHLORIDE BLD-SCNC: 104 MMOL/L (ref 99–110)
CO2: 26 MMOL/L (ref 21–32)
CREAT SERPL-MCNC: 1 MG/DL (ref 0.8–1.3)
GFR AFRICAN AMERICAN: >60
GFR NON-AFRICAN AMERICAN: >60
GLUCOSE BLD-MCNC: 98 MG/DL (ref 70–99)
HCT VFR BLD CALC: 36 % (ref 40.5–52.5)
HEMOGLOBIN: 12.3 G/DL (ref 13.5–17.5)
MCH RBC QN AUTO: 32.6 PG (ref 26–34)
MCHC RBC AUTO-ENTMCNC: 34.3 G/DL (ref 31–36)
MCV RBC AUTO: 95.2 FL (ref 80–100)
PDW BLD-RTO: 13 % (ref 12.4–15.4)
PLATELET # BLD: 98 K/UL (ref 135–450)
PLATELET SLIDE REVIEW: ABNORMAL
PMV BLD AUTO: 8.5 FL (ref 5–10.5)
POTASSIUM SERPL-SCNC: 4.2 MMOL/L (ref 3.5–5.1)
RBC # BLD: 3.78 M/UL (ref 4.2–5.9)
SLIDE REVIEW: ABNORMAL
SODIUM BLD-SCNC: 139 MMOL/L (ref 136–145)
TOTAL CK: 235 U/L (ref 39–308)
TOTAL PROTEIN: 6.8 G/DL (ref 6.4–8.2)
TSH REFLEX: 0.93 UIU/ML (ref 0.27–4.2)
WBC # BLD: 5.3 K/UL (ref 4–11)

## 2022-09-07 PROCEDURE — 99214 OFFICE O/P EST MOD 30 MIN: CPT | Performed by: STUDENT IN AN ORGANIZED HEALTH CARE EDUCATION/TRAINING PROGRAM

## 2022-09-07 RX ORDER — SIMVASTATIN 40 MG
40 TABLET ORAL NIGHTLY
Qty: 30 TABLET | Refills: 5 | Status: SHIPPED | OUTPATIENT
Start: 2022-09-07

## 2022-09-07 RX ORDER — ALPRAZOLAM 0.5 MG/1
0.5 TABLET ORAL SEE ADMIN INSTRUCTIONS
COMMUNITY
Start: 2022-08-26 | End: 2022-11-26

## 2022-09-07 RX ORDER — AMLODIPINE BESYLATE 10 MG/1
10 TABLET ORAL DAILY
Qty: 90 TABLET | Refills: 1 | Status: SHIPPED | OUTPATIENT
Start: 2022-09-07 | End: 2022-11-02 | Stop reason: SDUPTHER

## 2022-09-07 RX ORDER — RISPERIDONE 0.5 MG/1
TABLET, FILM COATED ORAL
COMMUNITY
Start: 2022-08-29

## 2022-09-07 ASSESSMENT — ENCOUNTER SYMPTOMS
CHEST TIGHTNESS: 0
COUGH: 0
SHORTNESS OF BREATH: 0

## 2022-09-07 NOTE — PATIENT INSTRUCTIONS
DASH Diet: Care Instructions  Your Care Instructions     The DASH diet is an eating plan that can help lower your blood pressure. DASH stands for Dietary Approaches to Stop Hypertension. Hypertension is high blood pressure. The DASH diet focuses on eating foods that are high in calcium, potassium, and magnesium. These nutrients can lower blood pressure. The foods that are highest in these nutrients are fruits, vegetables, low-fat dairy products, nuts, seeds, and legumes. But taking calcium, potassium, and magnesium supplements instead of eating foods that are high in those nutrients does not have the same effect. The DASH diet also includes whole grains, fish, and poultry. The DASH diet is one of several lifestyle changes your doctor may recommend to lower your high blood pressure. Your doctor may also want you to decrease the amount of sodium in your diet. Lowering sodium while following the DASH diet can lower blood pressure even further than just the DASH diet alone. Follow-up care is a key part of your treatment and safety. Be sure to make and go to all appointments, and call your doctor if you are having problems. It's also a good idea to know your test results and keep a list of the medicines you take. How can you care for yourself at home? Following the DASH diet  Eat 4 to 5 servings of fruit each day. A serving is 1 medium-sized piece of fruit, ½ cup chopped or canned fruit, 1/4 cup dried fruit, or 4 ounces (½ cup) of fruit juice. Choose fruit more often than fruit juice. Eat 4 to 5 servings of vegetables each day. A serving is 1 cup of lettuce or raw leafy vegetables, ½ cup of chopped or cooked vegetables, or 4 ounces (½ cup) of vegetable juice. Choose vegetables more often than vegetable juice. Get 2 to 3 servings of low-fat and fat-free dairy each day. A serving is 8 ounces of milk, 1 cup of yogurt, or 1 ½ ounces of cheese. Eat 6 to 8 servings of grains each day.  A serving is 1 slice of bread, 1 ounce of dry cereal, or ½ cup of cooked rice, pasta, or cooked cereal. Try to choose whole-grain products as much as possible. Limit lean meat, poultry, and fish to 2 servings each day. A serving is 3 ounces, about the size of a deck of cards. Eat 4 to 5 servings of nuts, seeds, and legumes (cooked dried beans, lentils, and split peas) each week. A serving is 1/3 cup of nuts, 2 tablespoons of seeds, or ½ cup of cooked beans or peas. Limit fats and oils to 2 to 3 servings each day. A serving is 1 teaspoon of vegetable oil or 2 tablespoons of salad dressing. Limit sweets and added sugars to 5 servings or less a week. A serving is 1 tablespoon jelly or jam, ½ cup sorbet, or 1 cup of lemonade. Eat less than 2,300 milligrams (mg) of sodium a day. If you limit your sodium to 1,500 mg a day, you can lower your blood pressure even more. Tips for success  Start small. Do not try to make dramatic changes to your diet all at once. You might feel that you are missing out on your favorite foods and then be more likely to not follow the plan. Make small changes, and stick with them. Once those changes become habit, add a few more changes. Try some of the following:  Make it a goal to eat a fruit or vegetable at every meal and at snacks. This will make it easy to get the recommended amount of fruits and vegetables each day. Try yogurt topped with fruit and nuts for a snack or healthy dessert. Add lettuce, tomato, cucumber, and onion to sandwiches. Combine a ready-made pizza crust with low-fat mozzarella cheese and lots of vegetable toppings. Try using tomatoes, squash, spinach, broccoli, carrots, cauliflower, and onions. Have a variety of cut-up vegetables with a low-fat dip as an appetizer instead of chips and dip. Sprinkle sunflower seeds or chopped almonds over salads. Or try adding chopped walnuts or almonds to cooked vegetables. Try some vegetarian meals using beans and peas.  Add garbanzo or kidney beans to salads. Make burritos and tacos with mashed morris beans or black beans. Where can you learn more? Go to https://feedPackpepic[employer]N SCHOOLeb.Within3. org and sign in to your [employer] account. Enter K456 in the [employer] box to learn more about \"DASH Diet: Care Instructions. \"     If you do not have an account, please click on the \"Sign Up Now\" link. Current as of: December 16, 2019               Content Version: 12.5  © 8018-6099 Healthwise, Incorporated. Care instructions adapted under license by [facility] ([facility]). If you have questions about a medical condition or this instruction, always ask your healthcare professional. [address] any warranty or liability for your use of this information.

## 2022-09-07 NOTE — PROGRESS NOTES
Visit Medications    Medication Sig Taking? Authorizing Provider   ALPRAZolam Martin Paci) 0.5 MG tablet Take 0.5 mg by mouth See Admin Instructions. Yes Historical Provider, MD   risperiDONE (RISPERDAL) 0.5 MG tablet TAKE 1 TABLET BY MOUTH 2 TIMES A DAY. Yes Historical Provider, MD   amLODIPine (NORVASC) 10 MG tablet Take 1 tablet by mouth daily Yes Leander Valdovinos DO   simvastatin (ZOCOR) 40 MG tablet Take 1 tablet by mouth nightly Yes Leander Valdovinos DO   melatonin 3 MG TABS tablet Take 1 tablet by mouth daily Yes Leander Valdovinos DO        Social History     Tobacco Use    Smoking status: Every Day     Packs/day: 1.00     Years: 40.00     Pack years: 40.00     Types: Cigarettes    Smokeless tobacco: Never   Substance Use Topics    Alcohol use: Never        Vitals:    09/07/22 0919 09/07/22 0932   BP: (!) 171/100 (!) 151/96   Pulse: 75    Temp: 97.3 °F (36.3 °C)    TempSrc: Temporal    Weight: 135 lb 10.6 oz (61.5 kg)    Height: 5' 7\" (1.702 m)      Estimated body mass index is 21.25 kg/m² as calculated from the following:    Height as of this encounter: 5' 7\" (1.702 m). Weight as of this encounter: 135 lb 10.6 oz (61.5 kg). Physical Exam  Vitals reviewed. Constitutional:       Appearance: Normal appearance. He is normal weight. HENT:      Head: Normocephalic and atraumatic. Nose: Nose normal.      Mouth/Throat:      Mouth: Mucous membranes are moist.      Pharynx: Oropharynx is clear. Eyes:      Extraocular Movements: Extraocular movements intact. Conjunctiva/sclera: Conjunctivae normal.   Cardiovascular:      Rate and Rhythm: Normal rate and regular rhythm. Pulses: Normal pulses. Heart sounds: Normal heart sounds. Pulmonary:      Effort: Pulmonary effort is normal.      Breath sounds: Normal breath sounds. Abdominal:      General: Abdomen is flat. Bowel sounds are normal.      Palpations: Abdomen is soft. Musculoskeletal:         General: Normal range of motion.       Cervical back: Normal range of motion and neck supple. Skin:     General: Skin is warm and dry. Capillary Refill: Capillary refill takes less than 2 seconds. Findings: No rash. Neurological:      Mental Status: He is alert and oriented to person, place, and time. Comments: Cont movement LUELISABET, L lower face (baseline)   Psychiatric:         Mood and Affect: Mood normal.         Behavior: Behavior normal.         Thought Content: Thought content normal.         Judgment: Judgment normal.       ASSESSMENT/PLAN:  1. Essential hypertension: Blood pressure elevated at his neurology appointment and today. We will increase his Norvasc to 10 mg and follow-up for repeat blood pressure check in 1 month. - amLODIPine (NORVASC) 10 MG tablet; Take 1 tablet by mouth daily  Dispense: 90 tablet; Refill: 1    2. Mixed hyperlipidemia: Compliant with statin. Given information on the Mediterranean diet. - simvastatin (ZOCOR) 40 MG tablet; Take 1 tablet by mouth nightly  Dispense: 30 tablet; Refill: 5    3. Choreic movements: Encourage patient to get the rest of her Adderall and fill it. Obtaining the lab orders from his neurologist, so we can go down and get it from our lab today. 4. Cigarette nicotine dependence without complication: Smokes about 10 cigarettes a day. Discussed negative health effects of cigarette smoking; including risk of additional strokes in the future. Not interested in assistance with cessation today. 5. Screening for lung cancer  - CT Lung Screen (Initial or Annual); Future    6. Screen for colon cancer  - [facility] - Shanna Marion MD, Gastroenterology, [place]    Return in about 4 weeks (around 10/5/2022) for Blood Pressure. An electronic signature was used to authenticate this note.     --Ziyad Alamo DO on 9/7/2022 at 9:40 AM

## 2022-09-08 LAB
ANTI-DSDNA IGG: 2 IU/ML (ref 0–9)
ANTI-NUCLEAR ANTIBODY (ANA): POSITIVE
HIV AG/AB: NORMAL
HIV ANTIGEN: NORMAL
HIV-1 ANTIBODY: NORMAL
HIV-2 AB: NORMAL

## 2022-09-09 ENCOUNTER — TELEPHONE (OUTPATIENT)
Dept: PRIMARY CARE CLINIC | Age: 62
End: 2022-09-09

## 2022-09-09 LAB — CERULOPLASMIN: 24 MG/DL (ref 15–30)

## 2022-09-09 NOTE — TELEPHONE ENCOUNTER
----- Message from Alexandrea Bravo DO sent at 9/8/2022  6:19 PM EDT -----  We need to fax these results to his neurologist.

## 2022-09-10 LAB
ANA PATTERN: ABNORMAL
ANA TITER: ABNORMAL
ANTICARDIOLIPIN IGG ANTIBODY: 13 GPL
ANTINUCLEAR AB INTERPRETIVE COMMENT: ABNORMAL
ANTINUCLEAR ANTIBODY, HEP-2, IGG: DETECTED
BETA-2 GLYCOPROTEIN 1 IGA ANTIBODY: <10 SAU
CARDIOLIPIN AB IGM: <10 MPL

## 2022-09-16 LAB
DRVVT CONFIRMATION TEST: POSITIVE RATIO
DRVVT SCREEN: 73 SEC (ref 33–44)
DRVVT,DIL: 52 SEC (ref 33–44)
HEXAGONAL PHOSPHOLIPID NEUTRALIZAT TEST: ABNORMAL
LUPUS ANTICOAG INTERP: ABNORMAL
PLT NEUTA: POSITIVE
PT D: 13.4 SEC (ref 12–15.5)
PTT D: >150 SEC (ref 32–48)
PTT-D CORR REFLEX: 80 SEC (ref 32–48)
PTT-HEPARIN NEUTRALIZED: ABNORMAL SEC (ref 32–48)
REPTILASE TIME: ABNORMAL SEC
THROMBIN TIME: 18.1 SEC (ref 14.7–19.5)

## 2022-09-24 ENCOUNTER — HEALTH MAINTENANCE LETTER (OUTPATIENT)
Age: 62
End: 2022-09-24

## 2022-11-01 ENCOUNTER — TELEPHONE (OUTPATIENT)
Dept: PRIMARY CARE CLINIC | Age: 62
End: 2022-11-01

## 2022-11-01 NOTE — TELEPHONE ENCOUNTER
----- Message from Hill Liu sent at 11/1/2022  9:14 AM EDT -----  Subject: Message to Provider    QUESTIONS  Information for Provider? Pt's daughter Haja Amezcua is calling to see if a   medical statement could be given to [employer]. He cannot pay bill and   they are turning off his electric tomorrow. Please call Sammi to advise.  ---------------------------------------------------------------------------  --------------  [employer]  [phone]; OK to leave message on voicemail  ---------------------------------------------------------------------------  --------------  SCRIPT ANSWERS  Relationship to Patient? Other  Representative Name? Bernadine Rosa  Is the Representative on the appropriate HIPAA document in Epic?  Yes

## 2022-11-01 NOTE — PROGRESS NOTES
2022     Mirela Rolle (:  1960) is a 62 y.o. male, here for evaluation of the following medical concerns:    HPI  Hypertension:  Home blood pressure monitoring: No.  He is not adherent to a low sodium diet. Patient denies chest pain, shortness of breath, headache, lightheadedness, blurred vision, peripheral edema, palpitations, dry cough, and fatigue. Antihypertensive medication side effects: no medication side effects noted. Use of agents associated with hypertension: none. Sodium (mmol/L)   Date Value   2022 139    BUN (mg/dL)   Date Value   2022 14    Glucose (mg/dL)   Date Value   2022 98      Potassium (mmol/L)   Date Value   2022 4.2    Creatinine (mg/dL)   Date Value   2022 1.0           Review of Systems   Constitutional:  Negative for fatigue. Eyes:  Negative for visual disturbance. Respiratory:  Negative for cough, chest tightness and shortness of breath. Cardiovascular:  Negative for chest pain, palpitations and leg swelling. Endocrine: Negative for polydipsia, polyphagia and polyuria. Genitourinary:  Negative for frequency. Skin:  Negative for rash. Neurological:  Negative for dizziness, syncope, weakness and light-headedness. Prior to Visit Medications    Medication Sig Taking? Authorizing Provider   amLODIPine (NORVASC) 10 MG tablet Take 1 tablet by mouth daily Yes Greg Munoz DO   ALPRAZolam Lesta Spells) 0.5 MG tablet Take 0.5 mg by mouth See Admin Instructions. Yes Historical Provider, MD   risperiDONE (RISPERDAL) 0.5 MG tablet TAKE 1 TABLET BY MOUTH 2 TIMES A DAY.  Yes Historical Provider, MD   simvastatin (ZOCOR) 40 MG tablet Take 1 tablet by mouth nightly Yes Greg Munoz DO   melatonin 3 MG TABS tablet Take 1 tablet by mouth daily Yes Greg Munoz DO        Social History     Tobacco Use    Smoking status: Every Day     Packs/day: 1.00     Years: 40.00     Pack years: 40.00     Types: Cigarettes Smokeless tobacco: Never   Substance Use Topics    Alcohol use: Never        Vitals:    11/02/22 1138 11/02/22 1157   BP: (!) 153/81 130/76   Pulse: 70    Temp: 97.2 °F (36.2 °C)    SpO2: 97%    Weight: 138 lb (62.6 kg)    Height: 5' 7\" (1.702 m)      Estimated body mass index is 21.61 kg/m² as calculated from the following:    Height as of this encounter: 5' 7\" (1.702 m). Weight as of this encounter: 138 lb (62.6 kg). Physical Exam  Constitutional:       Appearance: Normal appearance. He is normal weight. HENT:      Head: Normocephalic and atraumatic. Cardiovascular:      Rate and Rhythm: Normal rate and regular rhythm. Pulses: Normal pulses. Pulmonary:      Effort: Pulmonary effort is normal.      Breath sounds: Normal breath sounds. Skin:     General: Skin is warm and dry. Neurological:      Mental Status: He is alert. Mental status is at baseline. Psychiatric:         Mood and Affect: Mood normal.       ASSESSMENT/PLAN:  1. Essential hypertension: Blood pressure much better today. Tolerating current regimen without side effects. Refills given. Okay for routine follow-up in 6 months  - amLODIPine (NORVASC) 10 MG tablet; Take 1 tablet by mouth daily  Dispense: 90 tablet; Refill: 1    Return in about 6 months (around 5/2/2023) for Blood Pressure. An electronic signature was used to authenticate this note.     --Nancy Beltran DO on 11/2/2022 at 12:06 PM

## 2022-11-01 NOTE — PATIENT INSTRUCTIONS
DASH Diet: Care Instructions  Your Care Instructions     The DASH diet is an eating plan that can help lower your blood pressure. DASH stands for Dietary Approaches to Stop Hypertension. Hypertension is high blood pressure. The DASH diet focuses on eating foods that are high in calcium, potassium, and magnesium. These nutrients can lower blood pressure. The foods that are highest in these nutrients are fruits, vegetables, low-fat dairy products, nuts, seeds, and legumes. But taking calcium, potassium, and magnesium supplements instead of eating foods that are high in those nutrients does not have the same effect. The DASH diet also includes whole grains, fish, and poultry. The DASH diet is one of several lifestyle changes your doctor may recommend to lower your high blood pressure. Your doctor may also want you to decrease the amount of sodium in your diet. Lowering sodium while following the DASH diet can lower blood pressure even further than just the DASH diet alone. Follow-up care is a key part of your treatment and safety. Be sure to make and go to all appointments, and call your doctor if you are having problems. It's also a good idea to know your test results and keep a list of the medicines you take. How can you care for yourself at home? Following the DASH diet  Eat 4 to 5 servings of fruit each day. A serving is 1 medium-sized piece of fruit, ½ cup chopped or canned fruit, 1/4 cup dried fruit, or 4 ounces (½ cup) of fruit juice. Choose fruit more often than fruit juice. Eat 4 to 5 servings of vegetables each day. A serving is 1 cup of lettuce or raw leafy vegetables, ½ cup of chopped or cooked vegetables, or 4 ounces (½ cup) of vegetable juice. Choose vegetables more often than vegetable juice. Get 2 to 3 servings of low-fat and fat-free dairy each day. A serving is 8 ounces of milk, 1 cup of yogurt, or 1 ½ ounces of cheese. Eat 6 to 8 servings of grains each day.  A serving is 1 slice of bread, 1 ounce of dry cereal, or ½ cup of cooked rice, pasta, or cooked cereal. Try to choose whole-grain products as much as possible. Limit lean meat, poultry, and fish to 2 servings each day. A serving is 3 ounces, about the size of a deck of cards. Eat 4 to 5 servings of nuts, seeds, and legumes (cooked dried beans, lentils, and split peas) each week. A serving is 1/3 cup of nuts, 2 tablespoons of seeds, or ½ cup of cooked beans or peas. Limit fats and oils to 2 to 3 servings each day. A serving is 1 teaspoon of vegetable oil or 2 tablespoons of salad dressing. Limit sweets and added sugars to 5 servings or less a week. A serving is 1 tablespoon jelly or jam, ½ cup sorbet, or 1 cup of lemonade. Eat less than 2,300 milligrams (mg) of sodium a day. If you limit your sodium to 1,500 mg a day, you can lower your blood pressure even more. Tips for success  Start small. Do not try to make dramatic changes to your diet all at once. You might feel that you are missing out on your favorite foods and then be more likely to not follow the plan. Make small changes, and stick with them. Once those changes become habit, add a few more changes. Try some of the following:  Make it a goal to eat a fruit or vegetable at every meal and at snacks. This will make it easy to get the recommended amount of fruits and vegetables each day. Try yogurt topped with fruit and nuts for a snack or healthy dessert. Add lettuce, tomato, cucumber, and onion to sandwiches. Combine a ready-made pizza crust with low-fat mozzarella cheese and lots of vegetable toppings. Try using tomatoes, squash, spinach, broccoli, carrots, cauliflower, and onions. Have a variety of cut-up vegetables with a low-fat dip as an appetizer instead of chips and dip. Sprinkle sunflower seeds or chopped almonds over salads. Or try adding chopped walnuts or almonds to cooked vegetables. Try some vegetarian meals using beans and peas.  Add garbanzo or kidney beans to salads. Make burritos and tacos with mashed morris beans or black beans. Where can you learn more? Go to https://[employer]. org and sign in to your [employer] account. Enter Z226 in the enStage box to learn more about \"DASH Diet: Care Instructions. \"     If you do not have an account, please click on the \"Sign Up Now\" link. Current as of: December 16, 2019               Content Version: 12.5  © 2886-6623 Healthwise, Incorporated. Care instructions adapted under license by [facility] ([facility]). If you have questions about a medical condition or this instruction, always ask your healthcare professional. [address] any warranty or liability for your use of this information.

## 2022-11-02 ENCOUNTER — OFFICE VISIT (OUTPATIENT)
Dept: PRIMARY CARE CLINIC | Age: 62
End: 2022-11-02
Payer: MEDICAID

## 2022-11-02 VITALS
WEIGHT: 138 LBS | OXYGEN SATURATION: 97 % | BODY MASS INDEX: 21.66 KG/M2 | TEMPERATURE: 97.2 F | HEART RATE: 70 BPM | SYSTOLIC BLOOD PRESSURE: 130 MMHG | HEIGHT: 67 IN | DIASTOLIC BLOOD PRESSURE: 76 MMHG

## 2022-11-02 DIAGNOSIS — I10 ESSENTIAL HYPERTENSION: Primary | ICD-10-CM

## 2022-11-02 PROCEDURE — 3075F SYST BP GE 130 - 139MM HG: CPT | Performed by: STUDENT IN AN ORGANIZED HEALTH CARE EDUCATION/TRAINING PROGRAM

## 2022-11-02 PROCEDURE — 99213 OFFICE O/P EST LOW 20 MIN: CPT | Performed by: STUDENT IN AN ORGANIZED HEALTH CARE EDUCATION/TRAINING PROGRAM

## 2022-11-02 PROCEDURE — 3078F DIAST BP <80 MM HG: CPT | Performed by: STUDENT IN AN ORGANIZED HEALTH CARE EDUCATION/TRAINING PROGRAM

## 2022-11-02 RX ORDER — AMLODIPINE BESYLATE 10 MG/1
10 TABLET ORAL DAILY
Qty: 90 TABLET | Refills: 1 | Status: SHIPPED | OUTPATIENT
Start: 2022-11-02

## 2022-11-02 ASSESSMENT — ENCOUNTER SYMPTOMS
SHORTNESS OF BREATH: 0
COUGH: 0
CHEST TIGHTNESS: 0

## 2022-11-03 ENCOUNTER — TELEPHONE (OUTPATIENT)
Dept: PRIMARY CARE CLINIC | Age: 62
End: 2022-11-03

## 2022-11-03 NOTE — TELEPHONE ENCOUNTER
[facility] from [facility] called they need the appt yesterday to be updated as a pre op for Colonoscopy to be done on 11/10/22 please update and fax to . [phone]    Dr. Malik Schultz said this can be done

## 2022-12-28 ENCOUNTER — TELEPHONE (OUTPATIENT)
Dept: PRIMARY CARE CLINIC | Age: 62
End: 2022-12-28

## 2022-12-28 NOTE — TELEPHONE ENCOUNTER
Called below number back no answer lmom letting her know to call [employer] and have them fax form over to be completed

## 2023-03-05 DIAGNOSIS — E78.2 MIXED HYPERLIPIDEMIA: ICD-10-CM

## 2023-03-06 RX ORDER — SIMVASTATIN 40 MG
40 TABLET ORAL NIGHTLY
Qty: 30 TABLET | Refills: 5 | Status: SHIPPED | OUTPATIENT
Start: 2023-03-06

## 2023-03-06 NOTE — TELEPHONE ENCOUNTER
Medication:   Requested Prescriptions     Pending Prescriptions Disp Refills    simvastatin (ZOCOR) 40 MG tablet [Pharmacy Med Name: SIMVASTATIN 40 MG TABLET] 30 tablet 5     Sig: TAKE 1 TABLET BY MOUTH NIGHTLY        Last Filled:    09/7/22  Patient Phone Number: [phone] (home)     Last appt: 11/2/2022   Next appt: 5/2/2023    Last OARRS: No flowsheet data found.

## 2023-04-07 ENCOUNTER — HOSPITAL ENCOUNTER (EMERGENCY)
Age: 63
Discharge: LEFT BEFORE DISCHARGE | End: 2023-04-07

## 2023-04-07 ENCOUNTER — APPOINTMENT (OUTPATIENT)
Dept: CT IMAGING | Age: 63
End: 2023-04-07

## 2023-04-07 VITALS
DIASTOLIC BLOOD PRESSURE: 78 MMHG | SYSTOLIC BLOOD PRESSURE: 155 MMHG | HEART RATE: 71 BPM | OXYGEN SATURATION: 98 % | RESPIRATION RATE: 18 BRPM | TEMPERATURE: 98.8 F

## 2023-04-07 LAB
ALBUMIN SERPL-MCNC: 4 G/DL (ref 3.6–5.1)
ALBUMIN/GLOB SERPL: 1.1 {RATIO} (ref 1–2.4)
ALP SERPL-CCNC: 89 UNITS/L (ref 45–117)
ALT SERPL-CCNC: 22 UNITS/L
ANION GAP SERPL CALC-SCNC: 7 MMOL/L (ref 7–19)
AST SERPL-CCNC: 14 UNITS/L
BASOPHILS # BLD: 0.1 K/MCL (ref 0–0.3)
BASOPHILS NFR BLD: 1 %
BILIRUB SERPL-MCNC: 0.7 MG/DL (ref 0.2–1)
BUN SERPL-MCNC: 20 MG/DL (ref 6–20)
BUN/CREAT SERPL: 18 (ref 7–25)
CALCIUM SERPL-MCNC: 9.6 MG/DL (ref 8.4–10.2)
CHLORIDE SERPL-SCNC: 114 MMOL/L (ref 97–110)
CO2 SERPL-SCNC: 26 MMOL/L (ref 21–32)
CREAT SERPL-MCNC: 1.13 MG/DL (ref 0.67–1.17)
DEPRECATED RDW RBC: 42.6 FL (ref 39–50)
EOSINOPHIL # BLD: 0.1 K/MCL (ref 0–0.5)
EOSINOPHIL NFR BLD: 2 %
ERYTHROCYTE [DISTWIDTH] IN BLOOD: 12.1 % (ref 11–15)
FASTING DURATION TIME PATIENT: ABNORMAL H
GFR SERPLBLD BASED ON 1.73 SQ M-ARVRAT: 74 ML/MIN
GLOBULIN SER-MCNC: 3.6 G/DL (ref 2–4)
GLUCOSE SERPL-MCNC: 85 MG/DL (ref 70–99)
HCT VFR BLD CALC: 39 % (ref 39–51)
HGB BLD-MCNC: 12.8 G/DL (ref 13–17)
IMM GRANULOCYTES # BLD AUTO: 0 K/MCL (ref 0–0.2)
IMM GRANULOCYTES # BLD: 0 %
LYMPHOCYTES # BLD: 1.3 K/MCL (ref 1–4)
LYMPHOCYTES NFR BLD: 19 %
MCH RBC QN AUTO: 31.4 PG (ref 26–34)
MCHC RBC AUTO-ENTMCNC: 32.8 G/DL (ref 32–36.5)
MCV RBC AUTO: 95.8 FL (ref 78–100)
MONOCYTES # BLD: 0.5 K/MCL (ref 0.3–0.9)
MONOCYTES NFR BLD: 7 %
NEUTROPHILS # BLD: 4.9 K/MCL (ref 1.8–7.7)
NEUTROPHILS NFR BLD: 71 %
NRBC BLD MANUAL-RTO: 0 /100 WBC
PLATELET # BLD AUTO: 106 K/MCL (ref 140–450)
POTASSIUM SERPL-SCNC: 4 MMOL/L (ref 3.4–5.1)
PROT SERPL-MCNC: 7.6 G/DL (ref 6.4–8.2)
RBC # BLD: 4.07 MIL/MCL (ref 4.5–5.9)
SODIUM SERPL-SCNC: 143 MMOL/L (ref 135–145)
WBC # BLD: 6.9 K/MCL (ref 4.2–11)

## 2023-04-07 PROCEDURE — 85025 COMPLETE CBC W/AUTO DIFF WBC: CPT | Performed by: EMERGENCY MEDICINE

## 2023-04-07 PROCEDURE — 80053 COMPREHEN METABOLIC PANEL: CPT | Performed by: EMERGENCY MEDICINE

## 2023-04-07 PROCEDURE — 36415 COLL VENOUS BLD VENIPUNCTURE: CPT

## 2023-04-07 PROCEDURE — 10003627 HB COUNTER ED NO SERVICE

## 2023-04-08 LAB
RAINBOW EXTRA TUBES HOLD SPECIMEN: NORMAL

## 2023-07-29 ENCOUNTER — APPOINTMENT (OUTPATIENT)
Dept: GENERAL RADIOLOGY | Age: 63
End: 2023-07-29

## 2023-07-29 ENCOUNTER — HOSPITAL ENCOUNTER (EMERGENCY)
Age: 63
Discharge: HOME | End: 2023-07-29

## 2023-07-29 ENCOUNTER — APPOINTMENT (OUTPATIENT)
Dept: CT IMAGING | Age: 63
End: 2023-07-29

## 2023-07-29 VITALS
RESPIRATION RATE: 22 BRPM | TEMPERATURE: 98 F | DIASTOLIC BLOOD PRESSURE: 57 MMHG | OXYGEN SATURATION: 96 % | DIASTOLIC BLOOD PRESSURE: 57 MMHG | SYSTOLIC BLOOD PRESSURE: 157 MMHG | TEMPERATURE: 98 F | HEART RATE: 62 BPM | SYSTOLIC BLOOD PRESSURE: 157 MMHG | HEART RATE: 62 BPM | RESPIRATION RATE: 22 BRPM | OXYGEN SATURATION: 96 %

## 2023-07-29 DIAGNOSIS — G40.919 BREAKTHROUGH SEIZURE (HCC): ICD-10-CM

## 2023-07-29 DIAGNOSIS — G25.9 MOVEMENT DISORDER: ICD-10-CM

## 2023-07-29 DIAGNOSIS — D69.6 THROMBOCYTOPENIA (HCC): ICD-10-CM

## 2023-07-29 DIAGNOSIS — Z98.890 STATUS POST CRANIOTOMY: ICD-10-CM

## 2023-07-29 DIAGNOSIS — G40.909 SEIZURE DISORDER (HCC): Primary | ICD-10-CM

## 2023-07-29 DIAGNOSIS — G10 HUNTINGTON CHOREA (HCC): ICD-10-CM

## 2023-07-29 LAB
ALBUMIN SERPL-MCNC: 4 G/DL (ref 3.4–5)
ALBUMIN SERPL-MCNC: 4 G/DL (ref 3.4–5)
ALBUMIN/GLOB SERPL: 1.4 {RATIO} (ref 1.1–2.2)
ALBUMIN/GLOB SERPL: 1.4 {RATIO} (ref 1.1–2.2)
ALP SERPL-CCNC: 87 U/L (ref 40–129)
ALP SERPL-CCNC: 87 U/L (ref 40–129)
ALT SERPL-CCNC: 10 U/L (ref 10–40)
ALT SERPL-CCNC: 10 U/L (ref 10–40)
ANION GAP SERPL CALCULATED.3IONS-SCNC: 7 MMOL/L (ref 3–16)
ANION GAP SERPL CALCULATED.3IONS-SCNC: 7 MMOL/L (ref 3–16)
AST SERPL-CCNC: 12 U/L (ref 15–37)
AST SERPL-CCNC: 12 U/L (ref 15–37)
BASE EXCESS BLDV CALC-SCNC: 2 MMOL/L
BASE EXCESS BLDV CALC-SCNC: 2 MMOL/L
BASOPHILS # BLD: 0 K/UL (ref 0–0.2)
BASOPHILS # BLD: 0 K/UL (ref 0–0.2)
BASOPHILS NFR BLD: 0.2 %
BASOPHILS NFR BLD: 0.2 %
BILIRUB SERPL-MCNC: 0.6 MG/DL (ref 0–1)
BILIRUB SERPL-MCNC: 0.6 MG/DL (ref 0–1)
BUN SERPL-MCNC: 16 MG/DL (ref 7–20)
BUN SERPL-MCNC: 16 MG/DL (ref 7–20)
CALCIUM SERPL-MCNC: 9.1 MG/DL (ref 8.3–10.6)
CALCIUM SERPL-MCNC: 9.1 MG/DL (ref 8.3–10.6)
CHLORIDE SERPL-SCNC: 107 MMOL/L (ref 99–110)
CHLORIDE SERPL-SCNC: 107 MMOL/L (ref 99–110)
CO2 BLDV-SCNC: 28 MMOL/L
CO2 BLDV-SCNC: 28 MMOL/L
CO2 SERPL-SCNC: 25 MMOL/L (ref 21–32)
CO2 SERPL-SCNC: 25 MMOL/L (ref 21–32)
COHGB MFR BLDV: 2.3 %
COHGB MFR BLDV: 2.3 %
CREAT SERPL-MCNC: 1 MG/DL (ref 0.8–1.3)
CREAT SERPL-MCNC: 1 MG/DL (ref 0.8–1.3)
DEPRECATED RDW RBC AUTO: 12.8 % (ref 12.4–15.4)
DEPRECATED RDW RBC AUTO: 12.8 % (ref 12.4–15.4)
EOSINOPHIL # BLD: 0.1 K/UL (ref 0–0.6)
EOSINOPHIL # BLD: 0.1 K/UL (ref 0–0.6)
EOSINOPHIL NFR BLD: 2.4 %
EOSINOPHIL NFR BLD: 2.4 %
GFR SERPLBLD CREATININE-BSD FMLA CKD-EPI: >60 ML/MIN/{1.73_M2}
GFR SERPLBLD CREATININE-BSD FMLA CKD-EPI: >60 ML/MIN/{1.73_M2}
GLUCOSE SERPL-MCNC: 93 MG/DL (ref 70–99)
GLUCOSE SERPL-MCNC: 93 MG/DL (ref 70–99)
HCO3 BLDV-SCNC: 27 MMOL/L (ref 23–29)
HCO3 BLDV-SCNC: 27 MMOL/L (ref 23–29)
HCT VFR BLD AUTO: 38.9 % (ref 40.5–52.5)
HCT VFR BLD AUTO: 38.9 % (ref 40.5–52.5)
HGB BLD-MCNC: 13.2 G/DL (ref 13.5–17.5)
HGB BLD-MCNC: 13.2 G/DL (ref 13.5–17.5)
INR PPP: 1.07 (ref 0.84–1.16)
INR PPP: 1.07 (ref 0.84–1.16)
LACTATE BLDV-SCNC: 0.5 MMOL/L (ref 0.4–2)
LACTATE BLDV-SCNC: 0.5 MMOL/L (ref 0.4–2)
LYMPHOCYTES # BLD: 1 K/UL (ref 1–5.1)
LYMPHOCYTES # BLD: 1 K/UL (ref 1–5.1)
LYMPHOCYTES NFR BLD: 17.8 %
LYMPHOCYTES NFR BLD: 17.8 %
MAGNESIUM SERPL-MCNC: 1.9 MG/DL (ref 1.8–2.4)
MAGNESIUM SERPL-MCNC: 1.9 MG/DL (ref 1.8–2.4)
MCH RBC QN AUTO: 32.2 PG (ref 26–34)
MCH RBC QN AUTO: 32.2 PG (ref 26–34)
MCHC RBC AUTO-ENTMCNC: 33.9 G/DL (ref 31–36)
MCHC RBC AUTO-ENTMCNC: 33.9 G/DL (ref 31–36)
MCV RBC AUTO: 94.8 FL (ref 80–100)
MCV RBC AUTO: 94.8 FL (ref 80–100)
METHGB MFR BLDV: 0.2 %
METHGB MFR BLDV: 0.2 %
MONOCYTES # BLD: 0.4 K/UL (ref 0–1.3)
MONOCYTES # BLD: 0.4 K/UL (ref 0–1.3)
MONOCYTES NFR BLD: 7.3 %
MONOCYTES NFR BLD: 7.3 %
NEUTROPHILS # BLD: 3.9 K/UL (ref 1.7–7.7)
NEUTROPHILS # BLD: 3.9 K/UL (ref 1.7–7.7)
NEUTROPHILS NFR BLD: 72.3 %
NEUTROPHILS NFR BLD: 72.3 %
NT-PROBNP SERPL-MCNC: 211 PG/ML (ref 0–124)
NT-PROBNP SERPL-MCNC: 211 PG/ML (ref 0–124)
O2 THERAPY: NORMAL
O2 THERAPY: NORMAL
PCO2 BLDV: 42.9 MMHG (ref 40–50)
PCO2 BLDV: 42.9 MMHG (ref 40–50)
PH BLDV: 7.41 [PH] (ref 7.35–7.45)
PH BLDV: 7.41 [PH] (ref 7.35–7.45)
PLATELET # BLD AUTO: 91 K/UL (ref 135–450)
PLATELET # BLD AUTO: 91 K/UL (ref 135–450)
PMV BLD AUTO: 8.7 FL (ref 5–10.5)
PMV BLD AUTO: 8.7 FL (ref 5–10.5)
PO2 BLDV: 75 MMHG
PO2 BLDV: 75 MMHG
POTASSIUM SERPL-SCNC: 3.9 MMOL/L (ref 3.5–5.1)
POTASSIUM SERPL-SCNC: 3.9 MMOL/L (ref 3.5–5.1)
PROT SERPL-MCNC: 6.9 G/DL (ref 6.4–8.2)
PROT SERPL-MCNC: 6.9 G/DL (ref 6.4–8.2)
PROTHROMBIN TIME: 13.9 SEC (ref 11.5–14.8)
PROTHROMBIN TIME: 13.9 SEC (ref 11.5–14.8)
RBC # BLD AUTO: 4.1 M/UL (ref 4.2–5.9)
RBC # BLD AUTO: 4.1 M/UL (ref 4.2–5.9)
SAO2 % BLDV: 96 %
SAO2 % BLDV: 96 %
SODIUM SERPL-SCNC: 139 MMOL/L (ref 136–145)
SODIUM SERPL-SCNC: 139 MMOL/L (ref 136–145)
TROPONIN, HIGH SENSITIVITY: 11 NG/L (ref 0–22)
TROPONIN, HIGH SENSITIVITY: 11 NG/L (ref 0–22)
TSH SERPL DL<=0.005 MIU/L-ACNC: 0.94 UIU/ML (ref 0.27–4.2)
TSH SERPL DL<=0.005 MIU/L-ACNC: 0.94 UIU/ML (ref 0.27–4.2)
WBC # BLD AUTO: 5.4 K/UL (ref 4–11)
WBC # BLD AUTO: 5.4 K/UL (ref 4–11)

## 2023-07-29 PROCEDURE — 36415 COLL VENOUS BLD VENIPUNCTURE: CPT

## 2023-07-29 PROCEDURE — 85610 PROTHROMBIN TIME: CPT

## 2023-07-29 PROCEDURE — 84443 ASSAY THYROID STIM HORMONE: CPT

## 2023-07-29 PROCEDURE — 70450 CT HEAD/BRAIN W/O DYE: CPT

## 2023-07-29 PROCEDURE — 84484 ASSAY OF TROPONIN QUANT: CPT

## 2023-07-29 PROCEDURE — 99284 EMERGENCY DEPT VISIT MOD MDM: CPT

## 2023-07-29 PROCEDURE — 6360000002 HC RX W HCPCS: Performed by: PHYSICIAN ASSISTANT

## 2023-07-29 PROCEDURE — 82803 BLOOD GASES ANY COMBINATION: CPT

## 2023-07-29 PROCEDURE — 71045 X-RAY EXAM CHEST 1 VIEW: CPT

## 2023-07-29 PROCEDURE — 83735 ASSAY OF MAGNESIUM: CPT

## 2023-07-29 PROCEDURE — 96375 TX/PRO/DX INJ NEW DRUG ADDON: CPT

## 2023-07-29 PROCEDURE — 6370000000 HC RX 637 (ALT 250 FOR IP): Performed by: PHYSICIAN ASSISTANT

## 2023-07-29 PROCEDURE — 80053 COMPREHEN METABOLIC PANEL: CPT

## 2023-07-29 PROCEDURE — 96365 THER/PROPH/DIAG IV INF INIT: CPT

## 2023-07-29 PROCEDURE — 83605 ASSAY OF LACTIC ACID: CPT

## 2023-07-29 PROCEDURE — 85025 COMPLETE CBC W/AUTO DIFF WBC: CPT

## 2023-07-29 PROCEDURE — 82550 ASSAY OF CK (CPK): CPT

## 2023-07-29 PROCEDURE — 83880 ASSAY OF NATRIURETIC PEPTIDE: CPT

## 2023-07-29 RX ORDER — LORAZEPAM 2 MG/ML
1 INJECTION INTRAMUSCULAR ONCE
Status: COMPLETED | OUTPATIENT
Start: 2023-07-29 | End: 2023-07-29

## 2023-07-29 RX ORDER — LEVETIRACETAM 10 MG/ML
1000 INJECTION INTRAVASCULAR ONCE
Status: COMPLETED | OUTPATIENT
Start: 2023-07-29 | End: 2023-07-29

## 2023-07-29 RX ORDER — LEVETIRACETAM 500 MG/1
500 TABLET ORAL 2 TIMES DAILY
Qty: 60 TABLET | Refills: 0 | Status: SHIPPED | OUTPATIENT
Start: 2023-07-29

## 2023-07-29 RX ORDER — LORAZEPAM 0.5 MG/1
0.5 TABLET ORAL 2 TIMES DAILY PRN
Qty: 10 TABLET | Refills: 0 | Status: SHIPPED | OUTPATIENT
Start: 2023-07-29 | End: 2023-08-03

## 2023-07-29 RX ORDER — RISPERIDONE 0.5 MG/1
0.5 TABLET, ORALLY DISINTEGRATING ORAL ONCE
Status: COMPLETED | OUTPATIENT
Start: 2023-07-29 | End: 2023-07-29

## 2023-07-29 RX ORDER — RISPERIDONE 1 MG/1
1 TABLET ORAL 2 TIMES DAILY
Qty: 30 TABLET | Refills: 0 | Status: SHIPPED | OUTPATIENT
Start: 2023-07-29 | End: 2023-08-13

## 2023-07-29 RX ADMIN — LORAZEPAM 1 MG: 2 INJECTION INTRAMUSCULAR; INTRAVENOUS at 14:38

## 2023-07-29 RX ADMIN — RISPERIDONE 0.5 MG: 0.5 TABLET, ORALLY DISINTEGRATING ORAL at 14:43

## 2023-07-29 RX ADMIN — LEVETIRACETAM 1000 MG: 10 INJECTION, SOLUTION INTRAVENOUS at 16:12

## 2023-07-29 ASSESSMENT — PAIN - FUNCTIONAL ASSESSMENT
PAIN_FUNCTIONAL_ASSESSMENT: NONE - DENIES PAIN
PAIN_FUNCTIONAL_ASSESSMENT: NONE - DENIES PAIN

## 2023-07-29 NOTE — ED NOTES
Attempt to reach patient daughter for discharge, again unsuccessful     Jose Howell RN  07/29/23 0911

## 2023-07-29 NOTE — DISCHARGE INSTRUCTIONS
CT scan of head showed no new changes but did demonstrate the previous changes. Laboratory studies obtained showing a low platelet but this was noted in past laboratory studies. Otherwise all laboratory studies were normal or within normal notes. Thyroid function normal.  Chest x-ray showed no cardiopulmonary abnormalities. I do agree this is seizure activity. I do not see evidence of antiseizure medication. The patient was given Keppra 1 g IVPB. I will continue with Keppra 500 mg twice daily for the next 1 month. Patient given a referral for neurology to consult. 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 I did give Risperdal 0.5 mg in ED. Based on conversation with the family this was not adequate and I did increase the Risperdal to 1 mg twice daily. I did prescribe Ativan 0.5 mg twice daily as needed x5 days #10 without refill.

## 2023-07-29 NOTE — ED NOTES
Call received from patient's daughter.  She is in [place] and will be on her way to  patient     Sarai Madrid RN  07/29/23 6533

## 2023-07-29 NOTE — ED NOTES
Call received from a lady stating she is patient sister and oversees his care, however she also mentions she lives in another [place]. This RN reviewed chart and this family member is not listed as contact or decision maker. This RN asked relative if she has contact info for patient daughter who is the only one in chart and who just left a few minutes ago. RN suggested to reach out to that family member for information.  Family member became upset, yelled at this RN and said she \"ain't azael through this,\" and hung up on NAOMI Banegas RN  07/29/23 4227

## 2023-07-29 NOTE — ED PROVIDER NOTES
Keppra 500 mg twice daily x1 month and risperidone 1 mg twice daily x2 weeks. The patient did follow with neurology. I placed a referral in the system. Patient bailey seizure-free and significant preventative disorder with treatment. Granddaughter will be contacted the patient be discharged to her care as she accompany the patient and I did speak with her. She left the department pending the evaluation and treatment of her grandmother. Nursing staff did indicate that she did not answer phone with discharge and voicemail was full. We will continue trying to contact granddaughter. I am the Primary Clinician of Record. FINAL IMPRESSION      1. Seizure disorder ([address])    2. Breakthrough seizure ([address])    3. 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 Movement disorder    6. Thrombocytopenia [facility])          DISPOSITION/PLAN     DISPOSITION Decision To Discharge 07/29/2023 05:55:50 PM      PATIENT REFERRED TO:  Donita Galloway MD  [address] [place] [address] Pre-Services  [phone]          DISCHARGE MEDICATIONS:  Discharge Medication List as of 7/29/2023  8:22 PM        START taking these medications    Details   levETIRAcetam (KEPPRA) 500 MG tablet Take 1 tablet by mouth 2 times daily, Disp-60 tablet, R-0Print      risperiDONE (RISPERDAL) 1 MG tablet Take 1 tablet by mouth 2 times daily for 15 days, Disp-30 tablet, R-0Print      LORazepam (ATIVAN) 0.5 MG tablet Take 1 tablet by mouth 2 times daily as needed for Anxiety for up to 5 days. Max Daily Amount: 1 mg, Disp-10 tablet, R-0Print             DISCONTINUED MEDICATIONS:  Discharge Medication List as of 7/29/2023  8:22 PM                 (Please note that portions of this note were completed with a voice recognition program.  Efforts were made to edit the dictations but occasionally words are mis-transcribed. )    Molly Rojas PA-C (electronically signed)       Molly Rojas

## 2023-07-29 NOTE — ED NOTES
Unable to reach family for discharge at this time, charge nurse aware, will continue trying.       Sarai Madrid RN  07/29/23 7775

## 2023-07-30 NOTE — ED NOTES
Patient was discharged at [phone], he remained in room but a discharged patient until daughter arrived to take him home     Flor Luna  07/29/23 2029

## 2023-07-31 LAB — CK SERPL-CCNC: 87 U/L (ref 39–308)

## 2023-08-05 ENCOUNTER — HEALTH MAINTENANCE LETTER (OUTPATIENT)
Age: 63
End: 2023-08-05

## 2023-08-30 ENCOUNTER — TELEPHONE (OUTPATIENT)
Dept: PRIMARY CARE CLINIC | Age: 63
End: 2023-08-30
